# Patient Record
Sex: MALE | Race: WHITE | NOT HISPANIC OR LATINO
[De-identification: names, ages, dates, MRNs, and addresses within clinical notes are randomized per-mention and may not be internally consistent; named-entity substitution may affect disease eponyms.]

---

## 2017-09-12 ENCOUNTER — APPOINTMENT (OUTPATIENT)
Dept: GASTROENTEROLOGY | Facility: CLINIC | Age: 82
End: 2017-09-12
Payer: MEDICARE

## 2017-09-12 PROCEDURE — 99214 OFFICE O/P EST MOD 30 MIN: CPT

## 2018-06-07 ENCOUNTER — APPOINTMENT (OUTPATIENT)
Dept: GASTROENTEROLOGY | Facility: CLINIC | Age: 83
End: 2018-06-07
Payer: MEDICARE

## 2018-06-07 VITALS
HEART RATE: 65 BPM | DIASTOLIC BLOOD PRESSURE: 47 MMHG | HEIGHT: 67 IN | SYSTOLIC BLOOD PRESSURE: 157 MMHG | BODY MASS INDEX: 21.5 KG/M2 | WEIGHT: 137 LBS

## 2018-06-07 DIAGNOSIS — Z87.442 PERSONAL HISTORY OF URINARY CALCULI: ICD-10-CM

## 2018-06-07 DIAGNOSIS — Z85.46 PERSONAL HISTORY OF MALIGNANT NEOPLASM OF PROSTATE: ICD-10-CM

## 2018-06-07 PROCEDURE — 99214 OFFICE O/P EST MOD 30 MIN: CPT

## 2018-06-07 PROCEDURE — 82274 ASSAY TEST FOR BLOOD FECAL: CPT | Mod: QW

## 2018-06-07 RX ORDER — HYDROCORTISONE ACETATE 25 MG/1
25 SUPPOSITORY RECTAL
Qty: 1 | Refills: 3 | Status: ACTIVE | COMMUNITY
Start: 2018-06-07 | End: 1900-01-01

## 2019-10-08 ENCOUNTER — APPOINTMENT (OUTPATIENT)
Dept: GASTROENTEROLOGY | Facility: CLINIC | Age: 84
End: 2019-10-08
Payer: MEDICARE

## 2019-10-08 VITALS
DIASTOLIC BLOOD PRESSURE: 59 MMHG | WEIGHT: 138 LBS | BODY MASS INDEX: 21.66 KG/M2 | SYSTOLIC BLOOD PRESSURE: 153 MMHG | HEART RATE: 62 BPM | HEIGHT: 67 IN

## 2019-10-08 PROCEDURE — 82274 ASSAY TEST FOR BLOOD FECAL: CPT | Mod: QW

## 2019-10-08 PROCEDURE — 99214 OFFICE O/P EST MOD 30 MIN: CPT

## 2019-10-08 NOTE — ASSESSMENT
[FreeTextEntry1] : 1.  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 \par 2.  GERD, possible diabetic gastroparesis. \par 3.  Postprandial hypoglycemia, likely secondary to insulin regimen, gastroparesis/delayed gastric emptying.\par 4.  History of iron deficiency anemia.\par 5.  Long-standing type 1 diabetes mellitus on insulin pump. \par 6.  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  Hypertension. \par 8.  Prostate cancer, status post radiation seed implants and external beam radiation. \par 9.  Allergic to IV dye.\par  \par Plan:\par - Prior labs reviewed. Cr. 0.9 with trace proteinuria.  Patient was advised of long-term risks of PPIs.  As a retired pharmacist, he is aware of risks.  He was advised to use omeprazole 2-3 times a week with Pepcid on the other days.\par - The patient was advised the APC with a flexible sigmoidoscopy could be considered for intermittent rectal bleeding.  Sucralfate enemas could also be considered.  At this time, his symptoms are intermittent and minor.\par - Continue with bowel regimen to minimize constipation.

## 2019-10-08 NOTE — HISTORY OF PRESENT ILLNESS
[Nausea] : denies nausea [Heartburn] : stable heartburn [Vomiting] : denies vomiting [Diarrhea] : denies diarrhea [Constipation] : stable constipation [Abdominal Pain] : denies abdominal pain [Yellow Skin Or Eyes (Jaundice)] : denies jaundice [Abdominal Swelling] : denies abdominal swelling [Rectal Pain] : denies rectal pain [_________] : Performed [unfilled] [de-identified] : radiation proctitis [de-identified] : Mr. Pierson presents to the office today for evaluation of rectal bleeding.  He was last seen in the office in June 2018.\par \par He continues to experience episodic rectal bleeding, with a small amount of red blood on the toilet paper.  It happens more when he is constipated.  He had tried Miralax but finds that milk of magnesia works better for him.  He also suffers from heartburn.  Previously, it was well controlled with Prilosec, but his nephrologist in [place] had him stop it for proteinuria.  He had been using Zantac (and now Pepcid) but it does not work as well.\par \par His last colonoscopy in 2014 showed radiation proctitis.  His previous doctors at [place] were against argon plasma coagulation.  He had undergone 4 formaldehyde enemas before his radiation proctitis was treated many years ago.

## 2019-10-08 NOTE — PHYSICAL EXAM
[General Appearance - Alert] : alert [General Appearance - In No Acute Distress] : in no acute distress [General Appearance - Well Nourished] : well nourished [Sclera] : the sclera and conjunctiva were normal [PERRL With Normal Accommodation] : pupils were equal in size, round, and reactive to light [Extraocular Movements] : extraocular movements were intact [Hearing Threshold Finger Rub Not [place]] : hearing was normal [Outer Ear] : the ears and nose were normal in appearance [Neck Appearance] : the appearance of the neck was normal [Neck Cervical Mass (___cm)] : no neck mass was observed [Auscultation Breath Sounds / Voice Sounds] : lungs were clear to auscultation bilaterally [Heart Sounds] : normal S1 and S2 [Heart Rate And Rhythm] : heart rate was normal and rhythm regular [Edema] : there was no peripheral edema [Abdomen Soft] : soft [Bowel Sounds] : normal bowel sounds [Abdomen Tenderness] : non-tender [] : no hepato-splenomegaly [Abdomen Mass (___ Cm)] : no abdominal mass palpated [Normal Sphincter Tone] : normal sphincter tone [Abdomen Hernia] : no hernia was discovered [No Rectal Mass] : no rectal mass [Internal Hemorrhoid] : internal hemorrhoids [Cervical Lymph Nodes Enlarged Anterior Bilaterally] : anterior cervical [Supraclavicular Lymph Nodes Enlarged Bilaterally] : supraclavicular [No CVA Tenderness] : no ~M costovertebral angle tenderness [Abnormal Walk] : normal gait [Skin Color & Pigmentation] : normal skin color and pigmentation [No Focal Deficits] : no focal deficits [Skin Turgor] : normal skin turgor [Oriented To Time, Place, And Person] : oriented to person, place, and time [External Hemorrhoid] : no external hemorrhoids [Occult Blood Positive] : stool was negative for occult blood [FreeTextEntry1] : FIT negative

## 2020-01-31 RX ORDER — OMEPRAZOLE 20 MG/1
20 CAPSULE, DELAYED RELEASE ORAL DAILY
Qty: 90 | Refills: 3 | Status: ACTIVE | COMMUNITY
Start: 2019-10-08 | End: 1900-01-01

## 2020-07-20 ENCOUNTER — TRANSCRIPTION ENCOUNTER (OUTPATIENT)
Age: 85
End: 2020-07-20

## 2020-08-19 ENCOUNTER — APPOINTMENT (OUTPATIENT)
Dept: GASTROENTEROLOGY | Facility: CLINIC | Age: 85
End: 2020-08-19
Payer: MEDICARE

## 2020-08-19 VITALS
HEART RATE: 60 BPM | TEMPERATURE: 97.3 F | SYSTOLIC BLOOD PRESSURE: 150 MMHG | WEIGHT: 146 LBS | HEIGHT: 67 IN | DIASTOLIC BLOOD PRESSURE: 58 MMHG | BODY MASS INDEX: 22.91 KG/M2

## 2020-08-19 DIAGNOSIS — K64.8 OTHER HEMORRHOIDS: ICD-10-CM

## 2020-08-19 DIAGNOSIS — I10 ESSENTIAL (PRIMARY) HYPERTENSION: ICD-10-CM

## 2020-08-19 PROCEDURE — 82274 ASSAY TEST FOR BLOOD FECAL: CPT | Mod: QW

## 2020-08-19 PROCEDURE — 99214 OFFICE O/P EST MOD 30 MIN: CPT

## 2020-08-19 RX ORDER — FOLIC ACID 1 MG/1
1 TABLET ORAL
Refills: 0 | Status: ACTIVE | COMMUNITY

## 2020-08-19 RX ORDER — LISINOPRIL 10 MG/1
10 TABLET ORAL
Qty: 90 | Refills: 0 | Status: DISCONTINUED | COMMUNITY
Start: 2018-05-11 | End: 2020-08-19

## 2020-08-19 RX ORDER — INSULIN LISPRO 100 [IU]/ML
INJECTION, SOLUTION SUBCUTANEOUS
Refills: 0 | Status: ACTIVE | COMMUNITY

## 2020-08-19 RX ORDER — FAMOTIDINE 20 MG/1
20 TABLET, FILM COATED ORAL
Refills: 0 | Status: ACTIVE | COMMUNITY

## 2020-08-19 RX ORDER — INSULIN DEGLUDEC INJECTION 100 U/ML
100 INJECTION, SOLUTION SUBCUTANEOUS
Refills: 0 | Status: ACTIVE | COMMUNITY

## 2020-08-19 RX ORDER — CALCIUM CARBONATE/VITAMIN D3 500-10/5ML
30 LIQUID (ML) ORAL
Refills: 0 | Status: ACTIVE | COMMUNITY

## 2020-08-19 RX ORDER — INSULIN GLARGINE 100 [IU]/ML
INJECTION, SOLUTION SUBCUTANEOUS
Refills: 0 | Status: DISCONTINUED | COMMUNITY
End: 2020-08-19

## 2020-08-19 NOTE — ASSESSMENT
[FreeTextEntry1] : 1.  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 \par 2.  GERD, possible diabetic gastroparesis. \par 3.  Postprandial hypoglycemia, likely secondary to insulin regimen, gastroparesis/delayed gastric emptying.\par 4.  History of iron deficiency anemia.\par 5.  Long-standing type 1 diabetes mellitus on insulin pump. \par 6.  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  Hypertension. \par 8.  Prostate cancer, status post radiation seed implants and external beam radiation. \par 9.  Allergic to IV dye.\par 10.  Change in bowel habits.\par \par Plan:\par 1.  I had a lengthy conversation with the patient with regard to the possible need for colonoscopy, not so much because of the rectal bleeding, but more so because of his change in bowel habits.  I discussed with him the material risks, benefits and alternatives.  He wishes to wait a month to see if his bowel habits returned to their usual state and also to assess the degree of rectal bleeding.  I said that this was very reasonable, but if he needs to have a colonoscopy, he would most likely to be seen in the office first.\par 2.  Blood test results were reviewed.

## 2020-08-19 NOTE — REASON FOR VISIT
[Follow-Up: _____] : a [unfilled] follow-up visit [FreeTextEntry1] : Bright red blood in stool, change in bowel movements

## 2020-08-19 NOTE — REVIEW OF SYSTEMS
[Heartburn] : heartburn [Nocturia] : nocturia [Joint Pain] : joint pain [Joint Stiffness] : joint stiffness [Easy Bleeding] : a tendency for easy bleeding [Easy Bruising] : a tendency for easy bruising [Negative] : Heme/Lymph [FreeTextEntry7] : Rectal bleeding

## 2020-08-19 NOTE — CONSULT LETTER
[Dear  ___] : Dear  [unfilled], [( Thank you for referring [unfilled] for consultation for _____ )] : Thank you for referring [unfilled] for consultation for [unfilled] [Consult Letter:] : I had the pleasure of evaluating your patient, [unfilled]. [Consult Closing:] : Thank you very much for allowing me to participate in the care of this patient.  If you have any questions, please do not hesitate to contact me. [Sincerely,] : Sincerely, [Please see my note below.] : Please see my note below. [FreeTextEntry3] : Jermaine Oliva MD

## 2020-08-19 NOTE — HISTORY OF PRESENT ILLNESS
[FreeTextEntry1] : Torres developed a change in his bowel habits over the past month, and today, he had an increase in his usual rectal bleeding.  Initially, his stools were loose, and then he became constipated requiring laxatives, but over the past month, he has had 3-5 formed stools a day.  He became concerned today, and was seen emergently because of the rectal bleeding.  He denies abdominal pain.  He was recently diagnosed with polymyalgia rheumatica, and he currently takes prednisone 2.5 mg in the morning and 2 mg in the evening.

## 2020-08-19 NOTE — PHYSICAL EXAM
[General Appearance - Alert] : alert [General Appearance - Well Developed] : well developed [General Appearance - Well Nourished] : well nourished [General Appearance - In No Acute Distress] : in no acute distress [General Appearance - Well-Appearing] : healthy appearing [Sclera] : the sclera and conjunctiva were normal [Extraocular Movements] : extraocular movements were intact [Strabismus] : no strabismus was seen [Nasal Cavity] : the nasal mucosa and septum were normal [Both Tympanic Membranes Were Examined] : both tympanic membranes were normal [Examination Of The Oral Cavity] : the lips and gums were normal [Outer Ear] : the ears and nose were normal in appearance [Neck Appearance] : the appearance of the neck was normal [Oropharynx] : the oropharynx was normal [Jugular Venous Distention Increased] : there was no jugular-venous distention [Neck Cervical Mass (___cm)] : no neck mass was observed [Thyroid Diffuse Enlargement] : the thyroid was not enlarged [Auscultation Breath Sounds / Voice Sounds] : lungs were clear to auscultation bilaterally [Thyroid Nodule] : there were no palpable thyroid nodules [Lungs Percussion] : the lungs were normal to percussion [Heart Rate And Rhythm] : heart rate was normal and rhythm regular [Heart Sounds] : normal S1 and S2 [Heart Sounds Gallop] : no gallops [Heart Sounds Pericardial Friction Rub] : no pericardial rub [Abdominal Aorta] : the abdominal aorta was normal [Arterial Pulses Carotid] : carotid pulses were normal with no bruits [Full Pulse] : the pedal pulses are present [Veins - Varicosity Changes] : there were no varicosital changes [Bowel Sounds] : normal bowel sounds [Abdomen Tenderness] : non-tender [Abdomen Soft] : soft [Abdomen Hernia] : no hernia was discovered [Abdomen Mass (___ Cm)] : no abdominal mass palpated [Normal Sphincter Tone] : normal sphincter tone [No Rectal Mass] : no rectal mass [FreeTextEntry1] : Stool semisolid, brown, Hemoccult and iFOBT positive [Penis Abnormality] : the penis was normal [Occult Blood Positive] : stool positive for occult blood [Scrotum] : the scrotum was normal [Testes Swelling] : the testicles were not swollen [Testes Mass (___cm)] : there were no testicular masses [Cervical Lymph Nodes Enlarged Posterior Bilaterally] : posterior cervical [Cervical Lymph Nodes Enlarged Anterior Bilaterally] : anterior cervical [Supraclavicular Lymph Nodes Enlarged Bilaterally] : supraclavicular [Axillary Lymph Nodes Enlarged Bilaterally] : axillary [Femoral Lymph Nodes Enlarged Bilaterally] : femoral [No CVA Tenderness] : no ~M costovertebral angle tenderness [Inguinal Lymph Nodes Enlarged Bilaterally] : inguinal [Nail Clubbing] : no clubbing  or cyanosis of the fingernails [No Spinal Tenderness] : no spinal tenderness [Abnormal Walk] : normal gait [Motor Tone] : muscle strength and tone were normal [Musculoskeletal - Swelling] : no joint swelling seen [Involuntary Movements] : no involuntary movements were seen [Skin Color & Pigmentation] : normal skin color and pigmentation [Skin Turgor] : normal skin turgor [Skin Lesions] : no skin lesions [No Focal Deficits] : no focal deficits [] : no rash [Affect] : the affect was normal [Oriented To Time, Place, And Person] : oriented to person, place, and time [Impaired Insight] : insight and judgment were intact [Mood] : the mood was normal

## 2020-10-07 ENCOUNTER — APPOINTMENT (OUTPATIENT)
Dept: GASTROENTEROLOGY | Facility: CLINIC | Age: 85
End: 2020-10-07
Payer: MEDICARE

## 2020-10-07 VITALS
TEMPERATURE: 97.5 F | BODY MASS INDEX: 22.44 KG/M2 | DIASTOLIC BLOOD PRESSURE: 74 MMHG | HEART RATE: 71 BPM | HEIGHT: 67 IN | SYSTOLIC BLOOD PRESSURE: 170 MMHG | WEIGHT: 143 LBS

## 2020-10-07 DIAGNOSIS — I35.9 NONRHEUMATIC AORTIC VALVE DISORDER, UNSPECIFIED: ICD-10-CM

## 2020-10-07 DIAGNOSIS — R19.4 CHANGE IN BOWEL HABIT: ICD-10-CM

## 2020-10-07 DIAGNOSIS — M35.3 POLYMYALGIA RHEUMATICA: ICD-10-CM

## 2020-10-07 PROCEDURE — 99214 OFFICE O/P EST MOD 30 MIN: CPT

## 2020-10-07 PROCEDURE — 82274 ASSAY TEST FOR BLOOD FECAL: CPT | Mod: QW

## 2020-10-07 NOTE — REVIEW OF SYSTEMS
[Melena] : melmark [Arthralgias] : arthralgias [Easy Bruising] : a tendency for easy bruising [Negative] : Endocrine

## 2020-10-08 PROBLEM — I35.9 AORTIC VALVE DISEASE: Status: ACTIVE | Noted: 2020-08-19

## 2020-10-08 NOTE — PHYSICAL EXAM
[General Appearance - Alert] : alert [General Appearance - In No Acute Distress] : in no acute distress [General Appearance - Well Nourished] : well nourished [Sclera] : the sclera and conjunctiva were normal [PERRL With Normal Accommodation] : pupils were equal in size, round, and reactive to light [Extraocular Movements] : extraocular movements were intact [Outer Ear] : the ears and nose were normal in appearance [Hearing Threshold Finger Rub Not [place]] : hearing was normal [Neck Appearance] : the appearance of the neck was normal [Neck Cervical Mass (___cm)] : no neck mass was observed [Auscultation Breath Sounds / Voice Sounds] : lungs were clear to auscultation bilaterally [Heart Rate And Rhythm] : heart rate was normal and rhythm regular [Heart Sounds] : normal S1 and S2 [Edema] : there was no peripheral edema [Bowel Sounds] : normal bowel sounds [Abdomen Soft] : soft [Abdomen Tenderness] : non-tender [] : no hepato-splenomegaly [Abdomen Mass (___ Cm)] : no abdominal mass palpated [Abdomen Hernia] : no hernia was discovered [Normal Sphincter Tone] : normal sphincter tone [No Rectal Mass] : no rectal mass [Cervical Lymph Nodes Enlarged Anterior Bilaterally] : anterior cervical [Supraclavicular Lymph Nodes Enlarged Bilaterally] : supraclavicular [No CVA Tenderness] : no ~M costovertebral angle tenderness [Abnormal Walk] : normal gait [Skin Color & Pigmentation] : normal skin color and pigmentation [Skin Turgor] : normal skin turgor [No Focal Deficits] : no focal deficits [Oriented To Time, Place, And Person] : oriented to person, place, and time [Occult Blood Positive] : stool positive for occult blood [Impaired Insight] : insight and judgment were intact [External Hemorrhoid] : no external hemorrhoids [FreeTextEntry1] : brown stool with visible red blood, FIT positive

## 2020-10-08 NOTE — HISTORY OF PRESENT ILLNESS
[Heartburn] : stable heartburn [Nausea] : denies nausea [Vomiting] : denies vomiting [Diarrhea] : denies diarrhea [Constipation] : stable constipation [Yellow Skin Or Eyes (Jaundice)] : denies jaundice [Abdominal Pain] : denies abdominal pain [Abdominal Swelling] : denies abdominal swelling [Rectal Pain] : denies rectal pain [_________] : Performed [unfilled] [de-identified] : Mr. Pierson presents to the office today for follow up of change in bowel habits and rectal bleeding.  He was seen in the office in August 2020 by Dr. Oliva for similar complaints.\par \par Since July of this year, he reports that he has had 3-4 formed stools a day, mostly in the morning.  He is concerned about the change in bowel habits.  Starting in 2006, the patient had diarrhea due to radiation proctitis, which changed to constipation after several years.  For the past 6-7 years, he has been constipated and needed to take laxatives, but he has not needed any laxatives since July 2020.  When he had recurrent rectal bleeding, he was seen by Dr. Oliva in August and advised to undergo a colonoscopy for evaluation of change in bowel habits.  For the past 2 months, he has not seen any bleeding until 5 days ago when he saw a small amount of red blood with brown stool.  The patient is concerned that he may be missing something if he does not do the colonoscopy.  He is eating well and denies any abdominal pain or weight loss.\par \par Of note, the patient was diagnosed with PMR and has been on prednisone since February 2020.  Three days ago he started methotrexate with the hopes of weaning off prednisone.\par \par His last colonoscopy in 2014 showed radiation proctitis.  He has had episodic rectal bleeding for years, especially with constipation.  His previous doctors at [place] were against argon plasma coagulation.  He had undergone 4 formaldehyde enemas in 2006 before his radiation proctitis was treated. [de-identified] : radiation proctitis

## 2020-10-08 NOTE — ASSESSMENT
[FreeTextEntry1] : 1.  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 \par 2.  GERD, possible diabetic gastroparesis. \par 3.  Change in bowel habits (more formed stool instead of constipation or diarrhea).\par 4.  Postprandial hypoglycemia, likely secondary to insulin regimen, gastroparesis/delayed gastric emptying.\par 5.  History of iron deficiency anemia.\par 6.  Long-standing type 1 diabetes mellitus on insulin pump. \par 7.  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  Hypertension. \par 10.  Prostate cancer, status post radiation seed implants and external beam radiation. \par 11.  Allergic to IV dye.\par  \par Plan:\par - Recent labs reviewed.  Despite bleeding, Hb increased from 2018 to 2020, possibly due to steroid use in setting of autoimmune disease.\par - Patient was advised at length of risks and benefits of endoscopic evaluation vs conservative management for change in bowel habits and rectal bleeding, including flexible sigmoidoscopy without anesthesia vs colonoscopy.  He has a long-standing history of rectal bleeding with known radiation proctitis.  He currently has formed stools without abdominal pain or weight loss.  Patient will decide upon scheduling colonoscopy after speaking with his son and PCP.\par - Obtain recent cardiology evaluation given murmur on exam as this may change risk stratification.\par - The patient was advised the APC with a flexible sigmoidoscopy could be considered for intermittent rectal bleeding.  Sucralfate enemas could also be considered.  At this time, his symptoms are intermittent and minor.

## 2020-10-08 NOTE — CONSULT LETTER
[Dear  ___] : Dear  [unfilled], [Consult Letter:] : I had the pleasure of evaluating your patient, [unfilled]. [Please see my note below.] : Please see my note below. [Consult Closing:] : Thank you very much for allowing me to participate in the care of this patient.  If you have any questions, please do not hesitate to contact me. [Sincerely,] : Sincerely, [FreeTextEntry3] : Gene Forbes MD\par Department of Gastroenterology\par [facility]\par [address] [place] [address], [facility] [address]\[place] [address]\par Tel: [phone]\par Email: [email or web address]

## 2021-09-24 ENCOUNTER — NON-APPOINTMENT (OUTPATIENT)
Age: 86
End: 2021-09-24

## 2021-09-28 ENCOUNTER — APPOINTMENT (OUTPATIENT)
Dept: UROLOGY | Facility: CLINIC | Age: 86
End: 2021-09-28
Payer: MEDICARE

## 2021-09-28 VITALS
DIASTOLIC BLOOD PRESSURE: 65 MMHG | RESPIRATION RATE: 17 BRPM | HEIGHT: 67 IN | TEMPERATURE: 97.5 F | SYSTOLIC BLOOD PRESSURE: 159 MMHG | BODY MASS INDEX: 21.97 KG/M2 | HEART RATE: 79 BPM | WEIGHT: 140 LBS

## 2021-09-28 DIAGNOSIS — N28.9 DISORDER OF KIDNEY AND URETER, UNSPECIFIED: ICD-10-CM

## 2021-09-28 PROCEDURE — 99203 OFFICE O/P NEW LOW 30 MIN: CPT

## 2021-09-28 NOTE — ASSESSMENT
[FreeTextEntry1] : 87 yo M with prostate cancer, nephrolithiasis, and renal lesion 9mm\par \par - very low risk at this size\par - per patient psa is undetectable/negative\par - renal US in 1 year in office

## 2021-09-28 NOTE — HISTORY OF PRESENT ILLNESS
[FreeTextEntry1] : Mr Pierson is an 85 yo M with nephrolithiasis s/p PCNL at [place] with Dr. Stratton, prostate ca s/p brachytherapy 2007, who had a cardiology workup recently and had 2 stents placed. When scanned during evaluation found a small 9mm lesion on the left kidney.\par \par Denies LUTS or hematuria, has had proctitis after the radiation compounded by his anticoagulation. \par \par  [Urinary Incontinence] : no urinary incontinence [Urinary Retention] : no urinary retention [Urinary Urgency] : no urinary urgency [Urinary Frequency] : no urinary frequency [Nocturia] : no nocturia

## 2021-10-28 ENCOUNTER — APPOINTMENT (OUTPATIENT)
Dept: GASTROENTEROLOGY | Facility: CLINIC | Age: 86
End: 2021-10-28
Payer: MEDICARE

## 2021-10-28 ENCOUNTER — RX RENEWAL (OUTPATIENT)
Age: 86
End: 2021-10-28

## 2021-10-28 VITALS
WEIGHT: 140 LBS | BODY MASS INDEX: 22.23 KG/M2 | SYSTOLIC BLOOD PRESSURE: 145 MMHG | HEART RATE: 70 BPM | DIASTOLIC BLOOD PRESSURE: 57 MMHG | HEIGHT: 66.5 IN

## 2021-10-28 PROCEDURE — 82274 ASSAY TEST FOR BLOOD FECAL: CPT | Mod: QW

## 2021-10-28 PROCEDURE — 99214 OFFICE O/P EST MOD 30 MIN: CPT

## 2021-10-28 RX ORDER — ROSUVASTATIN CALCIUM 10 MG/1
10 TABLET, FILM COATED ORAL
Refills: 0 | Status: ACTIVE | COMMUNITY

## 2021-10-28 RX ORDER — ASPIRIN ENTERIC COATED TABLETS 81 MG 81 MG/1
81 TABLET, DELAYED RELEASE ORAL
Refills: 0 | Status: ACTIVE | COMMUNITY

## 2021-10-28 NOTE — ASSESSMENT
[FreeTextEntry1] : 1.  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 \par 2.  GERD, possible diabetic gastroparesis. \par 3.  Erratic bowel habits with constipation.\par 4.  CAD status post stent on DAPT.\par 5.  AS status post TAVR.\par 6.  History of iron deficiency anemia.\par 7.  Long-standing type 1 diabetes mellitus on insulin pump. \par 8.  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  Hypertension. \par 11.  Prostate cancer, status post radiation seed implants and external beam radiation. \par 12.  Allergic to IV dye.\par  \par Plan:\par - Continue Milk of magnesia, Colace to minimize constipation.\par - Patient requested prescription for sucralfate tablets which he will consider compounding into an enema for radiation proctitis.\par - Patient was offered gastric emptying study to evaluate for gastroparesis.  He will be going to [place] next and will be unable to perform tests at this time.  Patient was offered trial of low-dose metoclopramide but is unwilling to accept risk of neurologic side effects.  Patient wishes to research possible use of domperidone.  He was advised that domperidone is not FDA approved in the [place] and that it may have cardiac risks associated with its use.  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

## 2021-10-28 NOTE — HISTORY OF PRESENT ILLNESS
[Heartburn] : stable heartburn [Nausea] : denies nausea [Vomiting] : denies vomiting [Diarrhea] : denies diarrhea [Constipation] : stable constipation [Yellow Skin Or Eyes (Jaundice)] : denies jaundice [Abdominal Pain] : denies abdominal pain [Abdominal Swelling] : denies abdominal swelling [Rectal Pain] : denies rectal pain [_________] : Performed [unfilled] [de-identified] : Mr. Pierson presents to the office today for follow up with complaints of bloating, constipation, and rectal bleeding.  He was last seen in the office one year ago in October 2020.\par \par Since his last visit, the patient underwent a cardiac cath in September 2021 and was started on aspirin/ Plavix.  He then underwent a TAVR procedure at [facility] on October 11.  With the blood thinners, he had been seeing small amounts of rectal bleeding with constipation.  His last episode of bleeding occurred 10 days ago when he felt blood in his underwear without having a BM.  He has been using milk of magnesia with Colace every 2 days to minimize constipation.  He has also been using lemon juice in olive oil.  The patient has researched sucralfate enemas for radiation proctitis and would like to try it.\par \par The patient also reports bloating and upper abdominal discomfort after his evening meals.  He reports that he does not eat a large amount.  He reports that his endocrinologist had previously diagnosed him with mild gastroparesis and wanted him to try metoclopramide but he did not want to try it due to potential side effects.  He wonders if domperidone may work for him.  He is going back to [place] next week for he winter.\par \par PMH:   Starting in 2006, the patient had diarrhea due to radiation proctitis, which changed to constipation after several years.  For the past 6-7 years, he has been constipated and needed to take laxatives.  The patient was diagnosed with PMR in February 2020 and takes Prednisone.  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  He has had episodic rectal bleeding for years, especially with constipation.  His previous doctors at [place] were against argon plasma coagulation.  He had undergone 4 formaldehyde enemas in 2006 before his radiation proctitis was treated. [de-identified] : radiation proctitis

## 2021-10-28 NOTE — REVIEW OF SYSTEMS
[Loss Of Hearing] : hearing loss [Lower Ext Edema] : lower extremity edema [Constipation] : constipation [Joint Stiffness] : joint stiffness [Easy Bleeding] : a tendency for easy bleeding [Easy Bruising] : a tendency for easy bruising [Negative] : Endocrine

## 2021-10-28 NOTE — PHYSICAL EXAM
[General Appearance - Alert] : alert [General Appearance - In No Acute Distress] : in no acute distress [General Appearance - Well Nourished] : well nourished [Sclera] : the sclera and conjunctiva were normal [PERRL With Normal Accommodation] : pupils were equal in size, round, and reactive to light [Extraocular Movements] : extraocular movements were intact [Outer Ear] : the ears and nose were normal in appearance [Hearing Threshold Finger Rub Not [place]] : hearing was normal [Neck Appearance] : the appearance of the neck was normal [Neck Cervical Mass (___cm)] : no neck mass was observed [Auscultation Breath Sounds / Voice Sounds] : lungs were clear to auscultation bilaterally [Heart Rate And Rhythm] : heart rate was normal and rhythm regular [Heart Sounds] : normal S1 and S2 [Edema] : there was no peripheral edema [Bowel Sounds] : normal bowel sounds [Abdomen Soft] : soft [Abdomen Tenderness] : non-tender [] : no hepato-splenomegaly [Abdomen Mass (___ Cm)] : no abdominal mass palpated [Abdomen Hernia] : no hernia was discovered [Normal Sphincter Tone] : normal sphincter tone [No Rectal Mass] : no rectal mass [Cervical Lymph Nodes Enlarged Anterior Bilaterally] : anterior cervical [Supraclavicular Lymph Nodes Enlarged Bilaterally] : supraclavicular [No CVA Tenderness] : no ~M costovertebral angle tenderness [Abnormal Walk] : normal gait [Skin Color & Pigmentation] : normal skin color and pigmentation [Skin Turgor] : normal skin turgor [No Focal Deficits] : no focal deficits [Oriented To Time, Place, And Person] : oriented to person, place, and time [Impaired Insight] : insight and judgment were intact [External Hemorrhoid] : no external hemorrhoids [Occult Blood Positive] : stool was negative for occult blood [FreeTextEntry1] : brown stool, FIT negative

## 2021-10-28 NOTE — REASON FOR VISIT
[Follow-Up: _____] : a [unfilled] follow-up visit [FreeTextEntry1] : Constipation, rectal bleeding, bloating

## 2022-05-16 ENCOUNTER — APPOINTMENT (OUTPATIENT)
Dept: ORTHOPEDIC SURGERY | Facility: CLINIC | Age: 87
End: 2022-05-16
Payer: MEDICARE

## 2022-05-16 VITALS
BODY MASS INDEX: 22.5 KG/M2 | WEIGHT: 140 LBS | HEART RATE: 80 BPM | SYSTOLIC BLOOD PRESSURE: 148 MMHG | DIASTOLIC BLOOD PRESSURE: 56 MMHG | HEIGHT: 66 IN

## 2022-05-16 PROCEDURE — 99204 OFFICE O/P NEW MOD 45 MIN: CPT

## 2022-05-17 ENCOUNTER — APPOINTMENT (OUTPATIENT)
Dept: GASTROENTEROLOGY | Facility: CLINIC | Age: 87
End: 2022-05-17
Payer: MEDICARE

## 2022-05-17 VITALS
BODY MASS INDEX: 22.82 KG/M2 | SYSTOLIC BLOOD PRESSURE: 148 MMHG | HEIGHT: 66 IN | WEIGHT: 142 LBS | HEART RATE: 82 BPM | DIASTOLIC BLOOD PRESSURE: 60 MMHG

## 2022-05-17 DIAGNOSIS — D50.9 IRON DEFICIENCY ANEMIA, UNSPECIFIED: ICD-10-CM

## 2022-05-17 DIAGNOSIS — Z83.79 FAMILY HISTORY OF OTHER DISEASES OF THE DIGESTIVE SYSTEM: ICD-10-CM

## 2022-05-17 DIAGNOSIS — Z80.0 FAMILY HISTORY OF MALIGNANT NEOPLASM OF DIGESTIVE ORGANS: ICD-10-CM

## 2022-05-17 PROCEDURE — 99214 OFFICE O/P EST MOD 30 MIN: CPT

## 2022-05-17 PROCEDURE — 82274 ASSAY TEST FOR BLOOD FECAL: CPT | Mod: QW

## 2022-05-17 RX ORDER — PREDNISONE 1 MG/1
1 TABLET ORAL
Refills: 0 | Status: DISCONTINUED | COMMUNITY
End: 2022-05-17

## 2022-05-17 RX ORDER — PREDNISONE 2.5 MG/1
2.5 TABLET ORAL
Refills: 0 | Status: DISCONTINUED | COMMUNITY
End: 2022-05-17

## 2022-05-18 PROBLEM — D50.9 IRON DEFICIENCY ANEMIA: Status: ACTIVE | Noted: 2018-06-07

## 2022-05-18 PROBLEM — Z83.79 FAMILY HISTORY OF INFLAMMATORY BOWEL DISEASE: Status: ACTIVE | Noted: 2018-06-07

## 2022-05-18 PROBLEM — Z80.0 FHX: COLON CANCER: Status: ACTIVE | Noted: 2018-06-07

## 2022-05-18 NOTE — ASSESSMENT
[FreeTextEntry1] : 1.  Iron deficiency anemia.  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 \par 2.  GERD, likely diabetic gastroparesis, with improvement on domperidone. \par 3.  Erratic bowel habits with constipation.\par 4.  CAD status post stent on DAPT.\par 5.  AS status post TAVR.\par 6.  History of iron deficiency anemia.\par 7.  Long-standing type 1 diabetes mellitus on insulin pump. \par 8.  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  Hypertension. \par 11.  Prostate cancer, status post radiation seed implants and external beam radiation. \par 12.  Allergic to IV dye.\par  \par Plan:\par - Recent labs reviewed.\par - Continue small meals.  Domperidone prescription given (10 mg tabs #180).  Patient would rather use domperidone  instead of trying metoclopramide despite possible cardiac effects.  He is a retired pharmacist.\par - Continue sucralfate enemas PRN.\par - Patient was advised that risks of colonoscopy may outweigh and benefits of procedure given his age and comorbidities.  If endoscopic evaluation was to be performed for anemia, he was advised that Plavix would need to be stopped prior to the procedure.\par - Follow up in 5-6 months (before going to [place] for winter).

## 2022-05-18 NOTE — REASON FOR VISIT
[Follow-Up: _____] : a [unfilled] follow-up visit [FreeTextEntry1] : Routine exam - pt has gastroparesis, anemia

## 2022-05-18 NOTE — HISTORY OF PRESENT ILLNESS
[Heartburn] : stable heartburn [Nausea] : denies nausea [Vomiting] : denies vomiting [Diarrhea] : denies diarrhea [Constipation] : stable constipation [Yellow Skin Or Eyes (Jaundice)] : denies jaundice [Abdominal Pain] : denies abdominal pain [Abdominal Swelling] : denies abdominal swelling [Rectal Pain] : denies rectal pain [_________] : Performed [unfilled] [de-identified] : Mr. Pierson presents to the office today for follow up for anemia, gastroparesis, and constipation.  He was last seen in the office in September 2021.\par \par After his last visit, the patient went to [place] for the winter.  While playing pickleball, he developed sciatica and was found to have spinal stenosis.  On routine labs, he was also found to have worsening anemia with Hb of 10.2.  The patient reports that his anemia previously improved when he was on prednisone for PMR and he was weaned off of prednisone last fall.  His doctor in [place] recommended he see a hematologist and undergo a colonoscopy.  He will be seeing Dr. Salmon ([facility]) soon.  In April 2022, the patient started to have reflux and regurgitation.  He started eating less for the evening meal and started taking domperidone in the afternoon and evening.  With domperidone, the patient's gastroparesis symptoms have been controlled and he reports that his bowel movements have been better as well.  He must remain on aspirin and Plavix until September.  He denies melena but does intermittently see red blood with his BMs.  After his last visit, the patient was able to compound sucralfate enemas for radiation proctitis and uses them as needed.  They have helped to minimize bleeding.\par \par PMH:   Starting in 2006, the patient had diarrhea due to radiation proctitis, which changed to constipation after several years.  For the past 6-7 years, he has been constipated and needed to take laxatives.  The patient was diagnosed with PMR in February 2020 and takes Prednisone.  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  He has had episodic rectal bleeding for years, especially with constipation.  His previous doctors at [facility] were against argon plasma coagulation.  He had undergone 4 formaldehyde enemas in 2006 before his radiation proctitis was treated.\par \par In September 2021, the patient underwent a cardiac cath with CHINO and was started on aspirin/ Plavix.  He then underwent a TAVR procedure at [facility] in October 2021.  With the blood thinners, he had been seeing small amounts of rectal bleeding with constipation. \par \par The patient also reports bloating and upper abdominal discomfort after his evening meals.  He reports that his endocrinologist had previously diagnosed him with mild gastroparesis and wanted him to try metoclopramide but he did not want to try it due to potential side effects. [de-identified] : radiation proctitis

## 2022-05-18 NOTE — PHYSICAL EXAM
[General Appearance - Alert] : alert [General Appearance - In No Acute Distress] : in no acute distress [General Appearance - Well Nourished] : well nourished [Sclera] : the sclera and conjunctiva were normal [PERRL With Normal Accommodation] : pupils were equal in size, round, and reactive to light [Extraocular Movements] : extraocular movements were intact [Outer Ear] : the ears and nose were normal in appearance [Hearing Threshold Finger Rub Not [place]] : hearing was normal [Neck Appearance] : the appearance of the neck was normal [Neck Cervical Mass (___cm)] : no neck mass was observed [Auscultation Breath Sounds / Voice Sounds] : lungs were clear to auscultation bilaterally [Heart Rate And Rhythm] : heart rate was normal and rhythm regular [Heart Sounds] : normal S1 and S2 [Edema] : there was no peripheral edema [Bowel Sounds] : normal bowel sounds [Abdomen Soft] : soft [Abdomen Tenderness] : non-tender [] : no hepato-splenomegaly [Abdomen Mass (___ Cm)] : no abdominal mass palpated [Abdomen Hernia] : no hernia was discovered [Normal Sphincter Tone] : normal sphincter tone [No Rectal Mass] : no rectal mass [Cervical Lymph Nodes Enlarged Anterior Bilaterally] : anterior cervical [Supraclavicular Lymph Nodes Enlarged Bilaterally] : supraclavicular [No CVA Tenderness] : no ~M costovertebral angle tenderness [Abnormal Walk] : normal gait [Skin Color & Pigmentation] : normal skin color and pigmentation [Skin Turgor] : normal skin turgor [No Focal Deficits] : no focal deficits [Oriented To Time, Place, And Person] : oriented to person, place, and time [Impaired Insight] : insight and judgment were intact [External Hemorrhoid] : no external hemorrhoids [Occult Blood Positive] : stool was negative for occult blood [FreeTextEntry1] : rectal vault empty, mucus FIT negative

## 2022-05-18 NOTE — REVIEW OF SYSTEMS
[Constipation] : constipation [Heartburn] : heartburn [Limb Pain] : limb pain [Easy Bruising] : a tendency for easy bruising [Negative] : Endocrine [As Noted in HPI] : as noted in HPI

## 2022-07-15 ENCOUNTER — APPOINTMENT (OUTPATIENT)
Dept: PHYSICAL MEDICINE AND REHAB | Facility: CLINIC | Age: 87
End: 2022-07-15

## 2022-07-15 PROCEDURE — 99204 OFFICE O/P NEW MOD 45 MIN: CPT

## 2022-07-15 RX ORDER — VALSARTAN 160 MG/1
160 TABLET, COATED ORAL
Qty: 90 | Refills: 0 | Status: DISCONTINUED | COMMUNITY
Start: 2018-05-14 | End: 2022-07-15

## 2022-07-15 RX ORDER — HYDRALAZINE HYDROCHLORIDE 50 MG/1
50 TABLET ORAL
Qty: 180 | Refills: 0 | Status: ACTIVE | COMMUNITY
Start: 2022-04-20

## 2022-07-15 RX ORDER — CARVEDILOL 6.25 MG/1
6.25 TABLET, FILM COATED ORAL
Qty: 60 | Refills: 0 | Status: ACTIVE | COMMUNITY
Start: 2022-06-09

## 2022-07-15 RX ORDER — METHOTREXATE 2.5 MG/1
2.5 TABLET ORAL
Refills: 0 | Status: ACTIVE | COMMUNITY

## 2022-07-15 NOTE — HISTORY OF PRESENT ILLNESS
[Back] : back [___ mths] : [unfilled] month(s) ago [8] : a maximum pain level of 8/10 [Aching] : aching [Left] : left [Lateral] : lateral aspect of the [Feet] : feet [Standing] : standing [Laying] : laying [Gait Dysfunction] : gait dysfunction [PT] : PT [Injections] : injections [FreeTextEntry6] : 2 right sided epidurals in March in [place]

## 2022-07-15 NOTE — DATA REVIEWED
[MRI] : MRI [FreeTextEntry1] : MRI lumbar spine done 12/20/21 showed evidence of L4-L5 herniation with bilateral neural foraminal narrowing and moderate stenosis, bilateral foraminal narrowing in at L3-L4

## 2022-07-15 NOTE — ASSESSMENT
[FreeTextEntry1] : 87 year old male with low back pain and lumbar radicular pain secondary to stenosis.  Given the nature of the patient's complaints and lack of significant improvement following more conservative measures, we did discuss transforaminal epidural steroid injection.  Risks, benefits, and expectations of the procedure were reviewed.  The patient was provided with an educational pamphlet outlining the details of the procedure so that he/she may have the ability to review the information prior to proceeding.  The patient has agreed to proceed and will follow up with me for the procedure.\par

## 2022-07-19 LAB — SARS-COV-2 N GENE NPH QL NAA+PROBE: NOT DETECTED

## 2022-07-21 ENCOUNTER — OUTPATIENT (OUTPATIENT)
Dept: OUTPATIENT SERVICES | Facility: HOSPITAL | Age: 87
LOS: 1 days | End: 2022-07-21
Payer: MEDICARE

## 2022-07-21 ENCOUNTER — APPOINTMENT (OUTPATIENT)
Dept: PHYSICAL MEDICINE AND REHAB | Facility: CLINIC | Age: 87
End: 2022-07-21

## 2022-07-21 DIAGNOSIS — M54.16 RADICULOPATHY, LUMBAR REGION: ICD-10-CM

## 2022-07-21 PROCEDURE — 64483 NJX AA&/STRD TFRM EPI L/S 1: CPT

## 2022-08-16 ENCOUNTER — APPOINTMENT (OUTPATIENT)
Dept: PHYSICAL MEDICINE AND REHAB | Facility: CLINIC | Age: 87
End: 2022-08-16

## 2022-08-16 VITALS
SYSTOLIC BLOOD PRESSURE: 163 MMHG | OXYGEN SATURATION: 97 % | TEMPERATURE: 97.2 F | HEART RATE: 60 BPM | DIASTOLIC BLOOD PRESSURE: 62 MMHG

## 2022-08-16 DIAGNOSIS — M54.16 RADICULOPATHY, LUMBAR REGION: ICD-10-CM

## 2022-08-16 PROCEDURE — 99214 OFFICE O/P EST MOD 30 MIN: CPT

## 2022-08-16 NOTE — HISTORY OF PRESENT ILLNESS
[FreeTextEntry1] : Patient returns after the first TFESI a few weeks ago.  The patient reports 20% improvement in the symptoms.  The patient is pleased with the results and returns for a follow up visit today.\par \par

## 2022-08-16 NOTE — ASSESSMENT
[FreeTextEntry1] : 87 year old male with low back and lumbar radicular pain only minimally improved following his first TFESI.  He will follow up with me next week for his second procedure.

## 2022-08-31 ENCOUNTER — APPOINTMENT (OUTPATIENT)
Dept: ORTHOPEDIC SURGERY | Facility: CLINIC | Age: 87
End: 2022-08-31

## 2022-08-31 VITALS
DIASTOLIC BLOOD PRESSURE: 61 MMHG | SYSTOLIC BLOOD PRESSURE: 179 MMHG | WEIGHT: 140 LBS | HEIGHT: 66 IN | HEART RATE: 59 BPM | BODY MASS INDEX: 22.5 KG/M2

## 2022-08-31 DIAGNOSIS — M21.6X2 OTHER ACQUIRED DEFORMITIES OF LEFT FOOT: ICD-10-CM

## 2022-08-31 DIAGNOSIS — M21.42 FLAT FOOT [PES PLANUS] (ACQUIRED), LEFT FOOT: ICD-10-CM

## 2022-08-31 DIAGNOSIS — R60.0 LOCALIZED EDEMA: ICD-10-CM

## 2022-08-31 DIAGNOSIS — I99.8 OTHER DISORDER OF CIRCULATORY SYSTEM: ICD-10-CM

## 2022-08-31 PROCEDURE — 73610 X-RAY EXAM OF ANKLE: CPT | Mod: LT

## 2022-08-31 PROCEDURE — 99214 OFFICE O/P EST MOD 30 MIN: CPT

## 2022-09-04 PROBLEM — R60.0 PEDAL EDEMA: Status: ACTIVE | Noted: 2022-09-04

## 2022-09-08 ENCOUNTER — APPOINTMENT (OUTPATIENT)
Dept: ORTHOPEDIC SURGERY | Facility: CLINIC | Age: 87
End: 2022-09-08

## 2022-09-08 VITALS
HEIGHT: 66 IN | WEIGHT: 140 LBS | SYSTOLIC BLOOD PRESSURE: 173 MMHG | HEART RATE: 60 BPM | DIASTOLIC BLOOD PRESSURE: 58 MMHG | BODY MASS INDEX: 22.5 KG/M2

## 2022-09-08 DIAGNOSIS — M25.872 OTHER SPECIFIED JOINT DISORDERS, LEFT ANKLE AND FOOT: ICD-10-CM

## 2022-09-08 PROCEDURE — 20606 DRAIN/INJ JOINT/BURSA W/US: CPT | Mod: LT

## 2022-09-08 PROCEDURE — 99204 OFFICE O/P NEW MOD 45 MIN: CPT | Mod: 25

## 2022-09-08 NOTE — PROCEDURE
[de-identified] : Procedure:  Ultrasound Guided cortisone injection of the Left subtalar joint\par Indication for ultrasound guidance: Ensure placement within the joint, and avoidance of superficial neurovasculature and musculotendinous structures\par Indication for injection: Osteoarthritis\par \par Utlizing the Warp 9 II [employer] portable ultrasound machine, the Linear 25mm 10-5 MHz transducer, sterile probe cover and sterile ultrasound gel, ultrasound guidance with the probe in the longitudinal axis, utilizing an out of plane approach was used for the injection.  A discussion was had with the patient regarding this procedure and all questions were answered. All risks, benefits and alternatives were discussed. These included but were not limited to bleeding, infection, and allergic reaction. A timeout was done to ensure correct side and patient agreed to the procedure.  A [place] trisha was created on the skin utilizing a plastic needle cap to trisha the anticipated point of entry. Alcohol was used to clean the skin, and betadine was used to sterilize and prep the area in the anterolateral foot overlying the subtalar joint. Ethyl chloride spray was then used as a topical anesthetic. A 25-gauge needle was used to inject 2cc of 0.25% bupivacine and 1cc of 40mg/ml methylprednisolone into the subtalar joint. A sterile bandage was then applied. The patient tolerated the procedure well.

## 2022-09-08 NOTE — HISTORY OF PRESENT ILLNESS
[de-identified] : The patient is here today for continued management of chronic ankle pain. Patient was evaluated by my associate Dr. Aquino and determined to have subtalar impingement. He advised ultrasound guided cortisone injection for diagnostic and therapeutic purposes and patient like to proceed with injection at this time. No significant interval change since last evaluation.

## 2022-09-08 NOTE — PHYSICAL EXAM
[de-identified] : Constitutional: Well-nourished, well-developed, No acute distress\par Respiratory:  Good respiratory effort, no SOB\par Lymphatic: No regional lymphadenopathy, no lymphedema\par Psychiatric: Pleasant and normal affect, alert and oriented x3\par Skin: Clean dry and intact L LE\par Musculoskeletal: normal except where as noted in regional exam\par \par \par Left Ankle:\par APPEARANCE: Mild swelling, no marked deformities or malalignment\par POSITIVE TENDERNESS: + Subtalar joint\par NONTENDER: medial malleolus, lateral malleolus, tibialis posterior tendon, achilles tendon, no marked thickening of tendon, ATFL, CFL, PTFL, anterior tibiofibular ligament (high ankle), deltoid ligaments, 5th metatarsal. \par RANGE OF MOTION: Mildly limited dorsiflexion and plantar flexion due to pain, normal inversion/uterine. \par RESISTIVE TESTING: painless resisted dorsiflexion, plantar flexion, inversion & eversion. \par

## 2022-09-08 NOTE — DISCUSSION/SUMMARY
[de-identified] : Discussed findings of today's exam and possible causes of patient's pain.  Educated patient on their most probable diagnosis of Left subtalar joint pain.  Patient elected to proceed with a diagnostic/therapeutic ultrasound guided steroid injection today (see procedure note).  Patient advised to make note of whether their pain is improved starting in the next few minutes until 4-6 hours while the lidocaine numbs the interior of the subtalar joint; this is the diagnostic part of the injection. If pain is relieved immediately that helps us determine that the etiology of the pain is coming from within the subtalar joint. The steroid injected into the joint today will start to have an effect in the coming days, will be most effective in the next 1-2 weeks, and may last 1-2 months; that is the therapeutic portion of this injection. \par Re-examination of the involved ankle after the injection revealed noted improvement.  The patient should follow up with Dr. Aquino, in 1-2 months for further management.  Patient appreciates and agrees with current plan.\par \par I work as part of an academic orthopedic group and routinely have a physician in training (resident / fellow) working with me.  Any part of the history and physical exam performed by the physician in training was either directly reviewed and/or replicated by myself.  Any procedure performed by the physician in training was performed under my direct supervision and with the consent of the patient.\par \par This note was generated using dragon medical dictation software. A reasonable effort has been made for proofreading its contents, but typos may still remain. If there are any questions or points of clarification needed please notify my office.

## 2022-09-21 ENCOUNTER — APPOINTMENT (OUTPATIENT)
Dept: ORTHOPEDIC SURGERY | Facility: CLINIC | Age: 87
End: 2022-09-21

## 2022-10-13 ENCOUNTER — APPOINTMENT (OUTPATIENT)
Dept: GASTROENTEROLOGY | Facility: CLINIC | Age: 87
End: 2022-10-13

## 2022-10-13 VITALS
DIASTOLIC BLOOD PRESSURE: 49 MMHG | HEIGHT: 66 IN | SYSTOLIC BLOOD PRESSURE: 124 MMHG | WEIGHT: 135 LBS | HEART RATE: 64 BPM | BODY MASS INDEX: 21.69 KG/M2

## 2022-10-13 DIAGNOSIS — K21.9 GASTRO-ESOPHAGEAL REFLUX DISEASE W/OUT ESOPHAGITIS: ICD-10-CM

## 2022-10-13 DIAGNOSIS — K62.5 HEMORRHAGE OF ANUS AND RECTUM: ICD-10-CM

## 2022-10-13 PROCEDURE — 82274 ASSAY TEST FOR BLOOD FECAL: CPT | Mod: QW

## 2022-10-13 PROCEDURE — 99214 OFFICE O/P EST MOD 30 MIN: CPT | Mod: 25

## 2022-10-13 RX ORDER — CLOPIDOGREL 75 MG/1
75 TABLET, FILM COATED ORAL
Refills: 0 | Status: DISCONTINUED | COMMUNITY
End: 2022-10-13

## 2022-10-14 PROBLEM — K21.9 GERD (GASTROESOPHAGEAL REFLUX DISEASE): Status: ACTIVE | Noted: 2019-10-08

## 2022-10-14 PROBLEM — K62.5 RECTAL BLEEDING: Status: ACTIVE | Noted: 2018-06-07

## 2022-10-14 NOTE — HISTORY OF PRESENT ILLNESS
[Heartburn] : stable heartburn [Nausea] : denies nausea [Vomiting] : denies vomiting [Diarrhea] : denies diarrhea [Constipation] : stable constipation [Yellow Skin Or Eyes (Jaundice)] : denies jaundice [Abdominal Pain] : denies abdominal pain [Abdominal Swelling] : denies abdominal swelling [Rectal Pain] : denies rectal pain [_________] : Performed [unfilled] [FreeTextEntry1] : Mr. Pierson presents to the office today for follow up for anemia, gastroparesis, and constipation.  He was last seen in the office in May 2022.\par \par Since his last visit, he has been stable from a GI perspective.  Over the summer, he was having regular BMs, twice a day.  SInce the fall started, he has been experiencing constipation and has started to use Colace or Miralax again.  He currently uses domperidone 1-2 tablets before dinner only.  He regurgitates his dinner if he does not take it.  He is taking Prilosec daily and wonders if he should alternate with Pepcid.  He continues to see trace amounts of blood with his BMs but only when he wipes himself.  He has not needed to take sucralfate enemas for radiation proctitis for several months as his bleeding is minimal.  He received an iron infusion and his most recent Hb was 11.5.  If it drops further, he may receive Aranesp with iron.  His biggest complaint has been spinal stenosis and foot pain.  He has been limited in his physical activity and can no longer play pickleball.  He will be going to [place] for the winter.\par \par PMH:   Starting in 2006, the patient had diarrhea due to radiation proctitis, which changed to constipation after several years.  For the past 6-7 years, he has been constipated and needed to take laxatives.  The patient was diagnosed with PMR in February 2020 and takes Prednisone.  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  He has had episodic rectal bleeding for years, especially with constipation.  His previous doctors at [facility] were against argon plasma coagulation.  He had undergone 4 formaldehyde enemas in 2006 before his radiation proctitis was treated.\par \par In September 2021, the patient underwent a cardiac cath with [facility]INO and was started on aspirin/ Plavix.  He then underwent a TAV[facility] procedure at [facility] in October 2021.  With the blood thinners, he had been seeing small amounts of rectal bleeding with constipation. \par \par The patient also reports bloating and upper abdominal discomfort after his evening meals.  He reports that his endocrinologist had previously diagnosed him with mild gastroparesis and wanted him to try metoclopramide but he did not want to try it due to potential side effects. [de-identified] : radiation proctitis

## 2022-10-14 NOTE — ASSESSMENT
[FreeTextEntry1] : 1.  Iron deficiency anemia.  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 \par 2.  GERD, likely diabetic gastroparesis, with improvement on domperidone. \par 3.  Erratic bowel habits with constipation.\par 4.  CAD status post stent on DAPT.\par 5.  AS status post TAVR.\par 6.  History of iron deficiency anemia.\par 7.  Long-standing type 1 diabetes mellitus on insulin pump. \par 8.  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  Hypertension. \par 11.  Prostate cancer, status post radiation seed implants and external beam radiation. \par 12.  Allergic to IV dye.\par  \par Plan:\par - Recent labs reviewed.\par - Continue small meals.  Continue domperidone with dinner.  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

## 2022-10-14 NOTE — PHYSICAL EXAM
[Alert] : alert [Normal Voice/Communication] : normal voice/communication [Sclera] : the sclera and conjunctiva were normal [Hearing Threshold Finger Rub Not [place]] : hearing was normal [Normal Appearance] : the appearance of the neck was normal [No Respiratory Distress] : no respiratory distress [Auscultation Breath Sounds / Voice Sounds] : lungs were clear to auscultation bilaterally [Heart Rate And Rhythm] : heart rate was normal and rhythm regular [Normal S1, S2] : normal S1 and S2 [Bowel Sounds] : normal bowel sounds [Abdomen Tenderness] : non-tender [Normal Sphincter Tone] : normal sphincter tone [No External Hemorrhoid] : no external hemorrhoids [Occult Blood] : positive occult blood [FIT Test] : positive FIT test [No Focal Deficits] : no focal deficits [Oriented To Time, Place, And Person] : oriented to person, place, and time [de-identified] : fraile [de-identified] : slow gait [de-identified] : slightly depressed

## 2022-10-14 NOTE — REVIEW OF SYSTEMS
[Easy Bruising] : a tendency for easy bruising [Negative] : Endocrine [As Noted in HPI] : as noted in HPI [FreeTextEntry8] : Slow urination [FreeTextEntry9] : Limb pain, joint pain

## 2022-10-14 NOTE — REASON FOR VISIT
[Follow-up] : a follow-up of an existing diagnosis [FreeTextEntry1] : gastroparesis, GERD, rectal bleeding

## 2023-02-10 ENCOUNTER — NON-APPOINTMENT (OUTPATIENT)
Age: 88
End: 2023-02-10

## 2023-05-17 ENCOUNTER — APPOINTMENT (OUTPATIENT)
Dept: ORTHOPEDIC SURGERY | Facility: CLINIC | Age: 88
End: 2023-05-17
Payer: MEDICARE

## 2023-05-17 VITALS
DIASTOLIC BLOOD PRESSURE: 66 MMHG | SYSTOLIC BLOOD PRESSURE: 165 MMHG | TEMPERATURE: 98.1 F | OXYGEN SATURATION: 96 % | HEIGHT: 66 IN | BODY MASS INDEX: 22.5 KG/M2 | HEART RATE: 59 BPM | WEIGHT: 140 LBS

## 2023-05-17 PROCEDURE — 99214 OFFICE O/P EST MOD 30 MIN: CPT

## 2023-08-30 ENCOUNTER — APPOINTMENT (OUTPATIENT)
Dept: ORTHOPEDIC SURGERY | Facility: CLINIC | Age: 88
End: 2023-08-30
Payer: MEDICARE

## 2023-08-30 VITALS — HEIGHT: 66 IN | BODY MASS INDEX: 22.5 KG/M2 | WEIGHT: 140 LBS

## 2023-08-30 PROCEDURE — 99214 OFFICE O/P EST MOD 30 MIN: CPT

## 2023-08-30 NOTE — HISTORY OF PRESENT ILLNESS
[de-identified] : 88 year male presents for evaluation of lower back pain.  History L4-5 spondylolisthesis and left L5 radiculopathy. States that the pain has worsened since last visit. He has pain that radiates from the lower back to the left buttock, and from the left tibial region to the ankle. Has numbness/tingling.  Has been taking tylenol which are mildly effective. Avoids NSAIDs as he has Type 1 Diabetes. Has been participating with PT in [place] and [place] over the past year, which provides temporary relief.  S/P LESI in December, only had 1 week of relief.  He also took MDP pack which only helped when he took the first few doses.  Was referred to pain management for SCS. He underwent the SCS trial in July with Dr. Ramírez, but did not feel any improvement. No fever chills sweats nausea vomiting no bowel or bladder dysfunction, no recent weight loss or gain no night pain. This history is in addition to the intake form that I personally reviewed.  [Stable] : stable

## 2023-08-30 NOTE — ADDENDUM
[FreeTextEntry1] : This note was written by Avery Wylie on 08/30/2023 acting as scribe for Dr. Kendrick Srivastava M.D.  I, Kendrick Srivastava MD, have read and attest that all the information, medical decision making and discharge instructions within are true and accurate.

## 2023-08-30 NOTE — DISCUSSION/SUMMARY
[de-identified] : L4-L5 spondylolisthesis. Left L5 radiculopathy. Failed spinal cord stimulator. Discussed all options.  Lumbar MRI referral. F/U after MRI. All options discussed including rest, medicine, home exercise, acupuncture, Chiropractic care, Physical Therapy, Pain management, and last resort surgery. All questions were answered, all alternatives discussed and the patient is in complete agreement with the treatment plan which the patient contributed to and discussed with me through the shared decision making process. Follow-up appointment as instructed. Any issues and the patient will call or come in sooner. Wife agrees with plan.

## 2023-08-30 NOTE — PHYSICAL EXAM
[Stooped] : stooped [Babin's Sign] : negative Babin's sign [Pronator Drift] : negative pronator drift [SLR] : negative straight leg raise [de-identified] : 5 out of 5 motor strength, sensation is intact and symmetrical full range of motion flexion extension and rotation, no palpatory tenderness full range of motion of hips knees shoulders and elbows (all four extremities), no atrophy, negative straight leg raise, no pathological reflexes, no swelling, normal ambulation, no apparent distress skin is intact, no palpable lymph nodes, no upper or lower extremity instability, alert and oriented x3 and normal mood. Normal finger-to nose test.  [de-identified] : I reviewed, interpreted and clinically correlated the following outside imaging studies,  MRI Lumbar Spine 12/2021  Impression:  -3-4mm of Grade I Spondylolisthesis of L4 on L5 -Disc herniation at T12-L1 with effacement of the ventral aspect of the thecal sac. 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 -L2-3 Disc bulge with effacement of the ventral aspect of the thecal sac. Bilateral neural foraminal narrowing. No central canal stenosis. -L3-4 Disc bulge with effacement of the ventral aspect of the thecal sac. Bilateral neural foraminal narrowing. No central canal stenosis. -L4-5 Central disc herniation right paracentral with effacement of the ventral aspect of the thecal sac. Bilateral neural foraminal narrowing. Mild to moderate central canal stenosis.

## 2023-08-31 ENCOUNTER — APPOINTMENT (OUTPATIENT)
Dept: MRI IMAGING | Facility: IMAGING CENTER | Age: 88
End: 2023-08-31
Payer: MEDICARE

## 2023-08-31 ENCOUNTER — OUTPATIENT (OUTPATIENT)
Dept: OUTPATIENT SERVICES | Facility: HOSPITAL | Age: 88
LOS: 1 days | End: 2023-08-31
Payer: MEDICARE

## 2023-08-31 DIAGNOSIS — Z00.8 ENCOUNTER FOR OTHER GENERAL EXAMINATION: ICD-10-CM

## 2023-08-31 PROCEDURE — 72148 MRI LUMBAR SPINE W/O DYE: CPT

## 2023-08-31 PROCEDURE — 72148 MRI LUMBAR SPINE W/O DYE: CPT | Mod: 26,MH

## 2023-09-07 ENCOUNTER — APPOINTMENT (OUTPATIENT)
Dept: ORTHOPEDIC SURGERY | Facility: CLINIC | Age: 88
End: 2023-09-07
Payer: MEDICARE

## 2023-09-07 VITALS — BODY MASS INDEX: 22.5 KG/M2 | WEIGHT: 140 LBS | HEIGHT: 66 IN

## 2023-09-07 DIAGNOSIS — M48.07 SPINAL STENOSIS, LUMBOSACRAL REGION: ICD-10-CM

## 2023-09-07 DIAGNOSIS — M43.16 SPONDYLOLISTHESIS, LUMBAR REGION: ICD-10-CM

## 2023-09-07 PROCEDURE — 99214 OFFICE O/P EST MOD 30 MIN: CPT

## 2023-09-07 NOTE — HISTORY OF PRESENT ILLNESS
[Stable] : stable [de-identified] : 88 year male presents for evaluation of lower back pain.  History L4-5 spondylolisthesis and left L5 radiculopathy. States that the pain has worsened since last visit. He has pain that radiates from the lower back to the left buttock, and from the left tibial region to the ankle. Has numbness/tingling.  Has been taking tylenol which are mildly effective. Avoids NSAIDs as he has Type 1 Diabetes. Has been participating with PT in [place] and [place] over the past year, which provides temporary relief.  S/P LESI in December, only had 1 week of relief.  He also took MDP pack which only helped when he took the first few doses.  Was referred to pain management for SCS. He underwent the SCS trial in July with Dr. Ramírez, but did not feel any improvement. No fever chills sweats nausea vomiting no bowel or bladder dysfunction, no recent weight loss or gain no night pain. This history is in addition to the intake form that I personally reviewed.

## 2023-09-07 NOTE — DISCUSSION/SUMMARY
[Medication Risks Reviewed] : Medication risks reviewed [Surgical risks reviewed] : Surgical risks reviewed [de-identified] : L4-L5 spondylolisthesis with stenosis. Left L5 radiculopathy. Failed spinal cord stimulator. Discussed all options. Patient referred to Dr. Marley to discuss minimally invasive surgery. Refusing open procedur for now.  Risks of surgery includee infection, dural tear, nerve root injury, reherniation, future leg pain, future back pain, retained fragment, hematoma, urinary retention, worsening leg symptoms, foot drop, anesthetic risks, blood transfusion risks, positioning pain, visceral vascular injury, deep vein thrombosis, pulmonary embolus, and death. Somatosensory evoked potentials and EMG monitoring will be used. Surgery will involve lumbar decompression and fusion with or without instrumentation, local auto bone fusion, demineralized bone matrix, and neural monitoring. Patient will need spinal orthosis pre and post operatively. All risks were explained not exclusive to the ones mentioned alternatives were discussed and all questions were answered the patient agrees and understands the above and is in complete agreement with the plan. All options discussed including rest, medicine, home exercise, acupuncture, Chiropractic care, Physical Therapy, Pain management, and last resort surgery. All questions were answered, all alternatives discussed, and the patient is in complete agreement with the treatment plan which the patient contributed to and discussed with me through the shared decision-making process. Follow-up appointment as instructed. Any issues and the patient will call or come in sooner. Wife agrees with plan.

## 2023-09-07 NOTE — ADDENDUM
[FreeTextEntry1] : This note was written by Avery Wylie on 09/07/2023 acting as scribe for Dr. Kendrick Srivastava M.D.  I, Kendrick Srivastava MD, have read and attest that all the information, medical decision making and discharge instructions within are true and accurate.

## 2023-09-13 ENCOUNTER — APPOINTMENT (OUTPATIENT)
Dept: ORTHOPEDIC SURGERY | Facility: CLINIC | Age: 88
End: 2023-09-13
Payer: MEDICARE

## 2023-09-13 VITALS
BODY MASS INDEX: 22.5 KG/M2 | SYSTOLIC BLOOD PRESSURE: 130 MMHG | HEIGHT: 66 IN | WEIGHT: 140 LBS | DIASTOLIC BLOOD PRESSURE: 83 MMHG

## 2023-09-13 DIAGNOSIS — M54.50 LOW BACK PAIN, UNSPECIFIED: ICD-10-CM

## 2023-09-13 PROCEDURE — 72110 X-RAY EXAM L-2 SPINE 4/>VWS: CPT

## 2023-09-13 PROCEDURE — 99215 OFFICE O/P EST HI 40 MIN: CPT

## 2023-09-15 ENCOUNTER — NON-APPOINTMENT (OUTPATIENT)
Age: 88
End: 2023-09-15

## 2023-09-18 ENCOUNTER — NON-APPOINTMENT (OUTPATIENT)
Age: 88
End: 2023-09-18

## 2023-09-26 ENCOUNTER — OUTPATIENT (OUTPATIENT)
Dept: OUTPATIENT SERVICES | Facility: HOSPITAL | Age: 88
LOS: 1 days | End: 2023-09-26
Payer: MEDICARE

## 2023-09-26 VITALS
HEIGHT: 65 IN | WEIGHT: 143.96 LBS | RESPIRATION RATE: 18 BRPM | OXYGEN SATURATION: 97 % | HEART RATE: 61 BPM | SYSTOLIC BLOOD PRESSURE: 154 MMHG | TEMPERATURE: 98 F | DIASTOLIC BLOOD PRESSURE: 64 MMHG

## 2023-09-26 DIAGNOSIS — M54.16 RADICULOPATHY, LUMBAR REGION: ICD-10-CM

## 2023-09-26 DIAGNOSIS — Z95.2 PRESENCE OF PROSTHETIC HEART VALVE: Chronic | ICD-10-CM

## 2023-09-26 DIAGNOSIS — Z29.9 ENCOUNTER FOR PROPHYLACTIC MEASURES, UNSPECIFIED: ICD-10-CM

## 2023-09-26 DIAGNOSIS — E10.9 TYPE 1 DIABETES MELLITUS WITHOUT COMPLICATIONS: ICD-10-CM

## 2023-09-26 DIAGNOSIS — Z98.890 OTHER SPECIFIED POSTPROCEDURAL STATES: Chronic | ICD-10-CM

## 2023-09-26 DIAGNOSIS — Z98.49 CATARACT EXTRACTION STATUS, UNSPECIFIED EYE: Chronic | ICD-10-CM

## 2023-09-26 DIAGNOSIS — I25.10 ATHEROSCLEROTIC HEART DISEASE OF NATIVE CORONARY ARTERY WITHOUT ANGINA PECTORIS: ICD-10-CM

## 2023-09-26 DIAGNOSIS — Z90.89 ACQUIRED ABSENCE OF OTHER ORGANS: Chronic | ICD-10-CM

## 2023-09-26 DIAGNOSIS — Z01.818 ENCOUNTER FOR OTHER PREPROCEDURAL EXAMINATION: ICD-10-CM

## 2023-09-26 DIAGNOSIS — N18.9 CHRONIC KIDNEY DISEASE, UNSPECIFIED: ICD-10-CM

## 2023-09-26 DIAGNOSIS — Z92.3 PERSONAL HISTORY OF IRRADIATION: Chronic | ICD-10-CM

## 2023-09-26 DIAGNOSIS — Z98.61 CORONARY ANGIOPLASTY STATUS: Chronic | ICD-10-CM

## 2023-09-26 LAB
A1C WITH ESTIMATED AVERAGE GLUCOSE RESULT: 6.1 % — HIGH (ref 4–5.6)
ANION GAP SERPL CALC-SCNC: 10 MMOL/L — SIGNIFICANT CHANGE UP (ref 5–17)
BLD GP AB SCN SERPL QL: NEGATIVE — SIGNIFICANT CHANGE UP
BUN SERPL-MCNC: 41 MG/DL — HIGH (ref 7–23)
CALCIUM SERPL-MCNC: 10.2 MG/DL — SIGNIFICANT CHANGE UP (ref 8.4–10.5)
CHLORIDE SERPL-SCNC: 103 MMOL/L — SIGNIFICANT CHANGE UP (ref 96–108)
CO2 SERPL-SCNC: 26 MMOL/L — SIGNIFICANT CHANGE UP (ref 22–31)
CREAT SERPL-MCNC: 1.11 MG/DL — SIGNIFICANT CHANGE UP (ref 0.5–1.3)
EGFR: 65 ML/MIN/1.73M2 — SIGNIFICANT CHANGE UP
ESTIMATED AVERAGE GLUCOSE: 128 MG/DL — HIGH (ref 68–114)
GLUCOSE SERPL-MCNC: 136 MG/DL — HIGH (ref 70–99)
HCT VFR BLD CALC: 32.3 % — LOW (ref 39–50)
HGB BLD-MCNC: 10.6 G/DL — LOW (ref 13–17)
MCHC RBC-ENTMCNC: 32.8 GM/DL — SIGNIFICANT CHANGE UP (ref 32–36)
MCHC RBC-ENTMCNC: 33.4 PG — SIGNIFICANT CHANGE UP (ref 27–34)
MCV RBC AUTO: 101.9 FL — HIGH (ref 80–100)
MRSA PCR RESULT.: SIGNIFICANT CHANGE UP
NRBC # BLD: 0 /100 WBCS — SIGNIFICANT CHANGE UP (ref 0–0)
PLATELET # BLD AUTO: 191 K/UL — SIGNIFICANT CHANGE UP (ref 150–400)
POTASSIUM SERPL-MCNC: 5.1 MMOL/L — SIGNIFICANT CHANGE UP (ref 3.5–5.3)
POTASSIUM SERPL-SCNC: 5.1 MMOL/L — SIGNIFICANT CHANGE UP (ref 3.5–5.3)
RBC # BLD: 3.17 M/UL — LOW (ref 4.2–5.8)
RBC # FLD: 14.2 % — SIGNIFICANT CHANGE UP (ref 10.3–14.5)
RH IG SCN BLD-IMP: POSITIVE — SIGNIFICANT CHANGE UP
S AUREUS DNA NOSE QL NAA+PROBE: SIGNIFICANT CHANGE UP
SODIUM SERPL-SCNC: 139 MMOL/L — SIGNIFICANT CHANGE UP (ref 135–145)
WBC # BLD: 7.43 K/UL — SIGNIFICANT CHANGE UP (ref 3.8–10.5)
WBC # FLD AUTO: 7.43 K/UL — SIGNIFICANT CHANGE UP (ref 3.8–10.5)

## 2023-09-26 PROCEDURE — 86900 BLOOD TYPING SEROLOGIC ABO: CPT

## 2023-09-26 PROCEDURE — G0463: CPT

## 2023-09-26 PROCEDURE — 86850 RBC ANTIBODY SCREEN: CPT

## 2023-09-26 PROCEDURE — 86901 BLOOD TYPING SEROLOGIC RH(D): CPT

## 2023-09-26 PROCEDURE — 85027 COMPLETE CBC AUTOMATED: CPT

## 2023-09-26 PROCEDURE — 87641 MR-STAPH DNA AMP PROBE: CPT

## 2023-09-26 PROCEDURE — 80048 BASIC METABOLIC PNL TOTAL CA: CPT

## 2023-09-26 PROCEDURE — 83036 HEMOGLOBIN GLYCOSYLATED A1C: CPT

## 2023-09-26 PROCEDURE — 87640 STAPH A DNA AMP PROBE: CPT

## 2023-09-26 NOTE — H&P PST ADULT - PROBLEM SELECTOR PLAN 2
patient was evaluated by his cardiologist, report to obtain  patient to hold aspirin 10 days prior to surgery, last dose on 9/25/2023 night patient was evaluated by his cardiologist, report to obtain  patient to continue his aspirin periop

## 2023-09-26 NOTE — H&P PST ADULT - PROBLEM SELECTOR PLAN 4
fs on admit   no diabetic medication on DOS  patient to reduce dose of Tresiba night prior to surgery per protocol (will take 10 units night before)

## 2023-09-26 NOTE — H&P PST ADULT - HISTORY OF PRESENT ILLNESS
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 He reports worsen lower back pain x 2 years radiating to his lower extremities affecting his daily activities, have difficulty walking. He added "have epidural in the past with little relief (>1 year ago), had a spinal cord stimulator trial currently on prednisone for rheumatoid arthritis, says also help with the back pain. 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 He reports worsen lower back pain x 2 years radiating to his lower extremities affecting his daily activities, with difficulty walking. He added "have had epidural in the past with little relief (>1 year ago), had an unsuccessful spinal cord stimulator trial, currently on prednisone for rheumatoid arthritis, says also help a little with the back pain. 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 He reports worsen lower back pain x 2 years radiating to his lower extremities affecting his daily activities, with difficulty walking. He added "have had epidural in the past with little relief (>1 year ago), had an unsuccessful spinal cord stimulator trial, currently on prednisone for rheumatoid arthritis, says also help a little with the back pain. His medical history also significant for low grade MDS dx 2022, anemia of chronic disease, autoimmune dz dx after receiving pneumonia vaccine in 2016, T1DM dx age 37 (Dexcon glucose sensor in place, last A1c 5.7), prostate cancer dx 2006 (had seed implantation and radiation therapy), Aortic valve stenosis s/p TAVR 10/2021, CAD s/p PCI with stenting 09/2021, occasional heartburn, sciatica nerve pain, [place] (bilateral hearing aids).

## 2023-09-26 NOTE — H&P PST ADULT - NSICDXPASTSURGICALHX_GEN_ALL_CORE_FT
PAST SURGICAL HISTORY:  H/O cataract extraction     History of colonoscopy     History of tonsillectomy      PAST SURGICAL HISTORY:  H/O cataract extraction     History of brachytherapy     History of colonoscopy     History of percutaneous coronary intervention     History of tonsillectomy     S/P TAVR (transcatheter aortic valve replacement)

## 2023-09-26 NOTE — H&P PST ADULT - REASON FOR ADMISSION
"I am having minimal invasive spine surgery, lumbar region, I have close to 2 years, sciatica, with difficulty walking."   Goal: "to be able to walk without been in pain."  Pain tolerance level on a scale of 1-10- 3 "I am having minimal invasive spine surgery, lumbar region, I have close to 2 years  of pain, sciatica, with difficulty walking."   Goal: "to be able to walk without been in pain."  Pain tolerance level on a scale of 1-10- 3

## 2023-09-26 NOTE — H&P PST ADULT - NSANTHGENDERRD_ENT_A_CORE
[facility]                  Pulmonary, Critical Care, and Sleep Medicine     Name: Ricarda Nieves MRN: [id number]   : 1950 Hospital: [facility] [place]    Date: 2019        PCCM Note                                              Consult requesting physician: Dr. Jose Hatch  Reason for Consult: ventilator management, seizure    Subjective/History of Present Illness:     Patient is a 79 y.o. male with PMHx significant for ? DVT, epilepsy, found in active seizure by landlord, seizure in ambulance, received ativan, hypoxic due to apnea in ER, intubated in ER. CT head negative. Labile BP. Hypotension requiring vasopressors. 2019   Remains in icu   Extubated yesterday   Remains on NC low o2  No cough fever chills cp dyspnea  PVL positive for DVt, on heparin, seen by vascular. No seizure or other neurologic issues  No other overnight issues. No Known Allergies   No past medical history on file. No past surgical history on file. Social History     Tobacco Use    Smoking status: Not on file   Substance Use Topics    Alcohol use: Not on file      No family history on file. Prior to Admission medications    Medication Sig Start Date End Date Taking? Authorizing Provider   levETIRAcetam (KEPPRA) 750 mg tablet Take 1,500 mg by mouth two (2) times a day. Yes Provider, Historical   phenytoin sodium extended (DILANTIN PO) Take 300 mg by mouth two (2) times a day. Yes Provider, Historical   lacosamide (VIMPAT) 100 mg tab tablet Take 100 mg by mouth two (2) times a day.    Yes Provider, Historical     Current Facility-Administered Medications   Medication Dose Route Frequency    levETIRAcetam (KEPPRA) 1500 mg in saline (iso-osm) 100 ml IVPB  1,500 mg IntraVENous Q12H    heparin 25,000 units in D5W 250 ml infusion  18-36 Units/kg/hr IntraVENous TITRATE    pantoprazole (PROTONIX) 40 mg in sodium chloride 0.9% 10 mL injection  40 mg IntraVENous DAILY    0.9% sodium chloride 1,000 mL with mvi, adult no. 4 with vit K 10 mL, thiamine 855 mg, folic acid 1 mg infusion   IntraVENous Q24H    piperacillin-tazobactam (ZOSYN) 3.375 g in 0.9% sodium chloride (MBP/ADV) 100 mL MBP  3.375 g IntraVENous Q8H    dextrose 5 % - 0.45% NaCl infusion  125 mL/hr IntraVENous CONTINUOUS         Objective:   Vital Signs:    Visit Vitals  /79   Pulse 75   Temp 99.1 °F (37.3 °C)   Resp 19   Ht 6' 6\" (1.981 m)   Wt 83.5 kg (184 lb)   SpO2 98%   BMI 21.26 kg/m²       O2 Device: Nasal cannula   O2 Flow Rate (L/min): 2 l/min   Temp (24hrs), Av.1 °F (37.3 °C), Min:99 °F (37.2 °C), Max:99.1 °F (37.3 °C)       Intake/Output:   Last shift:      No intake/output data recorded. Last 3 shifts:  1901 -  0700  In: 5401.6 [I.V.:5401.6]  Out: 6750 [Urine:6750]      Intake/Output Summary (Last 24 hours) at 2019 1427  Last data filed at 2019 0526  Gross per 24 hour   Intake 2821.38 ml   Output 2450 ml   Net 371.38 ml       Last 3 Recorded Weights in this Encounter    19 1323 19 1043   Weight: 83.8 kg (184 lb 11.9 oz) 83.5 kg (184 lb)       Ventilator Settings:  Mode Rate Tidal Volume Pressure FiO2 PEEP   Spontaneous   450 ml  7 cm H2O 30 % 5 cm H20     Peak airway pressure: 31 cm H2O    Plateau pressure:     Tidal volume:    Minute ventilation: 9.1 l/min   SPO2 97       Physical Exam:     General/Neurology: aaox3, No focal deficit. Head:   Normocephalic, without obvious abnormality, atraumatic. Eye:   EOM intact, pupils not dilated, no scleral icterus, no pallor, no cyanosis. Neck:   Supple, symmetric. No lymphadenopathy. Trachea midline  Lung: Moderate air entry bilateral equal. No rhonchi. No wheezing. No stridors. Heart:   Regular rate & rhythm. S1 S2 present. No murmur. No JVD. Abdomen:  Soft. NT. ND. +BS. No masses. Extremities:  No pedal edema. No cyanosis. No clubbing. Pulses: 2+ and symmetric in DP.  Capillary refill: normal  Lymphatic: No cervical or supraclavicular palpable lymphadenopathy. Skin:   Color, texture, turgor normal. Left thumb dorsal aberration +      Data:       Recent Results (from the past 24 hour(s))   GLUCOSE, POC    Collection Time: 05/23/19  6:39 PM   Result Value Ref Range    Glucose (POC) 117 (H) 70 - 110 mg/dL   EKG, 12 LEAD, INITIAL    Collection Time: 05/23/19  7:54 PM   Result Value Ref Range    Ventricular Rate 82 BPM    Atrial Rate 82 BPM    P-R Interval 192 ms    QRS Duration 96 ms    Q-T Interval 362 ms    QTC Calculation (Bezet) 422 ms    Calculated P Axis 77 degrees    Calculated R Axis -32 degrees    Calculated T Axis 30 degrees    Diagnosis       Normal sinus rhythm  Left axis deviation  Abnormal ECG  When compared with ECG of 22-MAY-2019 14:50,  T wave inversion now evident in Inferior leads  Confirmed by Ana Maria Dutton MD, -- ([id number]) on 5/24/2019 11:12:57 AM     PTT    Collection Time: 05/23/19  8:01 PM   Result Value Ref Range    aPTT >180.0 (HH) 23.0 - 36.4 SEC   CARDIAC PANEL,(CK, CKMB & TROPONIN)    Collection Time: 05/23/19  8:01 PM   Result Value Ref Range    CK 3,280 (H) 39 - 308 U/L    CK - MB 9.4 (H) <3.6 ng/ml    CK-MB Index 0.3 0.0 - 4.0 %    Troponin-I, QT 0.20 (H) 0.0 - 0.045 NG/ML   GLUCOSE, POC    Collection Time: 05/24/19 12:21 AM   Result Value Ref Range    Glucose (POC) 112 (H) 70 - 694 mg/dL   METABOLIC PANEL, COMPREHENSIVE    Collection Time: 05/24/19  4:15 AM   Result Value Ref Range    Sodium 145 136 - 145 mmol/L    Potassium 3.7 3.5 - 5.5 mmol/L    Chloride 113 (H) 100 - 108 mmol/L    CO2 26 21 - 32 mmol/L    Anion gap 6 3.0 - 18 mmol/L    Glucose 95 74 - 99 mg/dL    BUN 11 7.0 - 18 MG/DL    Creatinine 1.14 0.6 - 1.3 MG/DL    BUN/Creatinine ratio 10 (L) 12 - 20      GFR est AA >60 >60 ml/min/1.73m2    GFR est non-AA >60 >60 ml/min/1.73m2    Calcium 7.7 (L) 8.5 - 10.1 MG/DL    Bilirubin, total 0.4 0.2 - 1.0 MG/DL    ALT (SGPT) 47 16 - 61 U/L    AST (SGOT) 132 (H) 15 - 37 U/L    Alk. phosphatase 69 45 - 117 U/L    Protein, total 5.2 (L) 6.4 - 8.2 g/dL    Albumin 2.6 (L) 3.4 - 5.0 g/dL    Globulin 2.6 2.0 - 4.0 g/dL    A-G Ratio 1.0 0.8 - 1.7     CBC WITH AUTOMATED DIFF    Collection Time: 05/24/19  4:15 AM   Result Value Ref Range    WBC 11.3 4.6 - 13.2 K/uL    RBC 3.46 (L) 4.70 - 5.50 M/uL    HGB 10.3 (L) 13.0 - 16.0 g/dL    HCT 31.8 (L) 36.0 - 48.0 %    MCV 91.9 74.0 - 97.0 FL    MCH 29.8 24.0 - 34.0 PG    MCHC 32.4 31.0 - 37.0 g/dL    RDW 13.3 11.6 - 14.5 %    PLATELET 175 (L) 264 - 420 K/uL    MPV 9.4 9.2 - 11.8 FL    NEUTROPHILS 76 (H) 40 - 73 %    LYMPHOCYTES 14 (L) 21 - 52 %    MONOCYTES 8 3 - 10 %    EOSINOPHILS 2 0 - 5 %    BASOPHILS 0 0 - 2 %    ABS. NEUTROPHILS 8.7 (H) 1.8 - 8.0 K/UL    ABS. LYMPHOCYTES 1.6 0.9 - 3.6 K/UL    ABS. MONOCYTES 0.9 0.05 - 1.2 K/UL    ABS. EOSINOPHILS 0.2 0.0 - 0.4 K/UL    ABS.  BASOPHILS 0.0 0.0 - 0.1 K/UL    DF AUTOMATED     MAGNESIUM    Collection Time: 05/24/19  4:15 AM   Result Value Ref Range    Magnesium 2.2 1.6 - 2.6 mg/dL   PHOSPHORUS    Collection Time: 05/24/19  4:15 AM   Result Value Ref Range    Phosphorus 2.8 2.5 - 4.9 MG/DL   PTT    Collection Time: 05/24/19  4:20 AM   Result Value Ref Range    aPTT >180.0 (HH) 23.0 - 36.4 SEC   CARDIAC PANEL,(CK, CKMB & TROPONIN)    Collection Time: 05/24/19  4:20 AM   Result Value Ref Range    CK 3,212 (H) 39 - 308 U/L    CK - MB 6.6 (H) <3.6 ng/ml    CK-MB Index 0.2 0.0 - 4.0 %    Troponin-I, QT 0.16 (H) 0.0 - 0.045 NG/ML   CALCIUM, IONIZED    Collection Time: 05/24/19  4:20 AM   Result Value Ref Range    Ionized Calcium 1.13 1.12 - 1.32 MMOL/L   GLUCOSE, POC    Collection Time: 05/24/19  5:05 AM   Result Value Ref Range    Glucose (POC) 113 (H) 70 - 110 mg/dL   FIBRINOGEN    Collection Time: 05/24/19 10:30 AM   Result Value Ref Range    Fibrinogen 227 210 - 451 mg/dL   PTT    Collection Time: 05/24/19 10:30 AM   Result Value Ref Range    aPTT >180.0 (HH) 23.0 - 36.4 SEC   MIXING STUDY, PT    Collection Time: 05/24/19 10:30 AM   Result Value Ref Range    PT mixing study          Mixing study interpretation PENDING     Prothrombin time 19.2 (H) 11.5 - 15.2 sec    INR 1.6 (H) 0.8 - 1.2      PT 1:1 mix patient PENDING sec    PT incubated patient PENDING sec         Chemistry Recent Labs     05/24/19 0415 05/23/19  0551 05/22/19  1850 05/22/19  1339   GLU 95 107* 83 140*    145 142 148*   K 3.7 4.4 4.5 3.2*   * 114* 110* 121*   CO2 26 24 24 11*   BUN 11 17 14 10   CREA 1.14 1.62* 1.37* 0.77   CA 7.7* 7.9* 8.2* 5.3*   MG 2.2  --   --  1.6   PHOS 2.8  --   --   --    AGAP 6 7 8 16   BUCR 10* 10* 10* 13   AP 69 82  --  66   TP 5.2* 5.6*  --  4.2*   ALB 2.6* 2.8*  --  2.0*   GLOB 2.6 2.8  --  2.2   AGRAT 1.0 1.0  --  0.9        Lactic Acid Lactic acid   Date Value Ref Range Status   05/22/2019 1.9 0.4 - 2.0 MMOL/L Final     Recent Labs     05/22/19  1850   LAC 1.9        Liver Enzymes Protein, total   Date Value Ref Range Status   05/24/2019 5.2 (L) 6.4 - 8.2 g/dL Final     Albumin   Date Value Ref Range Status   05/24/2019 2.6 (L) 3.4 - 5.0 g/dL Final     Globulin   Date Value Ref Range Status   05/24/2019 2.6 2.0 - 4.0 g/dL Final     A-G Ratio   Date Value Ref Range Status   05/24/2019 1.0 0.8 - 1.7   Final     AST (SGOT)   Date Value Ref Range Status   05/24/2019 132 (H) 15 - 37 U/L Final     Alk.  phosphatase   Date Value Ref Range Status   05/24/2019 69 45 - 117 U/L Final     Recent Labs     05/24/19  0415 05/23/19  0551 05/22/19  1339   TP 5.2* 5.6* 4.2*   ALB 2.6* 2.8* 2.0*   GLOB 2.6 2.8 2.2   AGRAT 1.0 1.0 0.9   SGOT 132* 83* 26   AP 69 82 66        CBC w/Diff Recent Labs     05/24/19  0415 05/23/19  1400 05/23/19  0551 05/22/19  1339   WBC 11.3 12.5 12.4 9.3   RBC 3.46* 3.97* 3.90* 3.35*   HGB 10.3* 11.8* 11.7* 10.0*   HCT 31.8* 36.6 35.8* 31.4*   * 155 136 158   GRANS 76* 85*  --  83*   LYMPH 14* 7*  --  14*   EOS 2 0  --  1        Cardiac Enzymes Lab Results   Component Value Date/Time    CPK 3,212 (H) 05/24/2019 04:20 AM    CPK 3,280 (H) 05/23/2019 08:01 PM    CKMB 6.6 (H) 05/24/2019 04:20 AM    CKMB 9.4 (H) 05/23/2019 08:01 PM    CKND1 0.2 05/24/2019 04:20 AM    CKND1 0.3 05/23/2019 08:01 PM    TROIQ 0.16 (H) 05/24/2019 04:20 AM    TROIQ 0.20 (H) 05/23/2019 08:01 PM        BNP No results found for: BNP, BNPP, XBNPT     Coagulation Recent Labs     05/24/19  1030 05/24/19  0420 05/23/19  2001   PTP 19.2*  --   --    INR 1.6*  --   --    APTT >180.0* >180.0* >180.0*         Thyroid  No results found for: T4, T3U, TSH, TSHEXT, TSHEXT    No results found for: T4     Urinalysis Lab Results   Component Value Date/Time    Color YELLOW 05/22/2019 02:27 PM    Appearance CLEAR 05/22/2019 02:27 PM    Specific gravity 1.018 05/22/2019 02:27 PM    pH (UA) 5.5 05/22/2019 02:27 PM    Protein 300 (A) 05/22/2019 02:27 PM    Glucose NEGATIVE  05/22/2019 02:27 PM    Ketone NEGATIVE  05/22/2019 02:27 PM    Bilirubin NEGATIVE  05/22/2019 02:27 PM    Urobilinogen 0.2 05/22/2019 02:27 PM    Nitrites NEGATIVE  05/22/2019 02:27 PM    Leukocyte Esterase NEGATIVE  05/22/2019 02:27 PM    Epithelial cells FEW 05/22/2019 02:27 PM    Bacteria 1+ (A) 05/22/2019 02:27 PM    WBC 0 to 1 05/22/2019 02:27 PM    RBC 0 to 3 05/22/2019 02:27 PM        Micro  Recent Labs     05/22/19 1945 05/22/19 1850 05/22/19  1610   CULT NO GROWTH 2 DAYS NO GROWTH 2 DAYS FEW YEAST*  MANY NORMAL RESPIRATORY ALEJANDRO     Recent Labs     05/22/19 1945 05/22/19 1850 05/22/19  1610   CULT NO GROWTH 2 DAYS NO GROWTH 2 DAYS FEW YEAST*  MANY NORMAL RESPIRATORY ALEJANDRO        ABG Recent Labs     05/23/19  0540 05/22/19  1404   PHI 7.312* 7.127*   PCO2I 40.4 56.1*   PO2I 156* 285*   HCO3I 20.5* 18.6*   FIO2I 40 100          CT (Most Recent) (CT chest reviewed by me) Results from Hospital Encounter encounter on 05/22/19   CT HEAD WO CONT    Narrative EXAM: CT head    INDICATION: Seizure, patient unresponsive. COMPARISON: None.     TECHNIQUE: Axial CT imaging of the head was performed without intravenous  contrast.    One or more dose reduction techniques were used on this CT: automated exposure  control, adjustment of the mAs and/or kVp according to patient size, and  iterative reconstruction techniques. The specific techniques used on this CT  exam have been documented in the patient's electronic medical record. Digital  Imaging and Communications in Medicine (DICOM) format image data are available  to nonaffiliated external healthcare facilities or entities on a secure, media  free, reciprocally searchable basis with patient authorization for at least a  12-month period after this study. _______________    FINDINGS:    BRAIN AND POSTERIOR FOSSA: Cortical sulci volume is within normal limits for  patient age. The ventricular size and configuration is normal. Physiologic  bilateral basal ganglia calcifications are present. There is no intracranial  hemorrhage, mass effect, or midline shift. The gray-white matter differentiation  is within normal limits. Periventricular white matter low-attenuation is  present. EXTRA-AXIAL SPACES AND MENINGES: There are no abnormal extra-axial fluid  collections. CALVARIUM: Intact. SINUSES: Clear. OTHER: None.    _______________      Impression 1. No acute intracranial abnormality. 2. Mild periventricular white matter low-attenuation; nonspecific finding  favored to reflect sequela of chronic ischemic microvascular change. CRITICAL RESULT:  CODE S results called to Dr. Jayy Modi in the emergency room  prior to dictation at 1425 hours on 5/22/2019            XR (Most Recent).  CXR  reviewed by me and compared with previous CXR Results from Hospital Encounter encounter on 05/22/19   XR CHEST PORT    Narrative EXAM: XR CHEST PORT    CLINICAL INDICATION/HISTORY: intubated  -Additional: None    COMPARISON: May 22, 2019    TECHNIQUE: Portable frontal view of the chest    _______________    FINDINGS:    SUPPORT DEVICES: Endotracheal tube with tip projecting approximately 5.9 cm  above the juwan. Right IJ central venous catheter with tip projecting over the  superior cavoatrial junction. Nasogastric tube below the diaphragm, tip  collimated from view. HEART AND MEDIASTINUM: Stable cardiac size and mediastinal contours. LUNGS AND PLEURAL SPACES: Linear areas of opacity at each lung base are present  without discrete pneumonic consolidation, pneumothorax, or pleural effusion. BONY THORAX AND SOFT TISSUES: Unremarkable.    _______________      Impression 1. Endotracheal tube, visualized nasogastric tube, and right IJ central venous  catheter in stable position. 2. Linear areas of basilar atelectasis without superimposed acute radiographic  abnormality. EEG 5/23/19: IMPRESSION: This EEG is abnormal due to generalized slowing and low amplitude background, which is an indicator of diffuse cerebral dysfunction from any cause including -but not limited to- toxic, metabolic and infectious etiologies. Intermittent beta-range waves are likely caused by sedative medications such as benzodiazepine. There are no ictal or interictal findings with a specific correlation with seizures. 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 · Subacute non-occlusive thrombus present in the right profunda femoral vein. · Subacute non-occlusive thrombus present in the right proximal femoral vein. · Subacute non-occlusive thrombus present in the right mid femoral vein. · Acute occlusive thrombus present in the right distal femoral vein. · Subacute non-occlusive thrombus present in the right popliteal vein. · Acute non-occlusive thrombus present in the left common femoral vein. · Acute non-occlusive thrombus present in the left saphenofemoral junction. · Subacute non-occlusive thrombus present in the left profunda femoral vein.   · Subacute non-occlusive thrombus present in the left femoral vein.  · Subacute non-occlusive present in the left femoral vein. · Subacute non-occlusive thrombus present in the left popliteal vein. Echo 5/23/19:  · Left Ventricle: Mild-to-moderate systolic dysfunction. Estimated left ventricular ejection fraction is 41 - 45%. E/E' ratio is 8.91. .  · Left Atrium: Mildly dilated left atrium. · Aortic Valve: Probably trileaflet aortic valve. · Mitral Valve: Trace mitral valve regurgitation. · Right Ventricle: Mildly dilated right ventricle. · Pulmonary Artery: There is no evidence of pulmonary hypertension. VQ scan 5/23/19:  Moderate perfusion defects in the right upper and left lower lobe are  intermediate probability for pulmonary embolism. IMPRESSION:   · Recurrent seizure, with hx of epilepsy (home regimen keppra. Dilantin stopped recently due to interaction with coumadin)  · Left weakness, ? Pako's paralysis. Resolved. · Respiratory failure / apnea, intubated in ER 5/22/19, extubated 5/23/19  · Possible aspiration pneumonia vs pneumonitis. · B/l LE extensive acute, subacute and chronic DVT. · Intermediate probability for PE on VQ scan. · LVEF 41-45%. · Hypocalcemia  · Hypokalemia, replaced  · Hypernatremia   · Elevated ammonia likely due to seizure, resolved. · Elevated CPK, likely due to seizure  · Mildly abnormal troponin likely due to seizure  · Hx of DVT (not on anticoagulation, reported by spouse)  · THC positive   · Anemia, no bleeding. · Code status: full      RECOMMENDATIONS:   Respiratory: extubated to NC yesterday. respi status good. No overnight respi issues. Keep SPO2 >=92%. HOB 30 degree elevation all the time. Aggressive pulmonary toileting. Aspiration precautions. Incentive spirometry. CVS: off levophed since yesterday. Echo with low LVEF, consider cardiology consult. Defer to primary team.   B/l LE PVL positive for acute subacute and chronic DVT. Intermediate probability for PE on VQ.   Not sure if he was compliant to coumadin. Hypercoagulable w/u pending. On heparin drip. Vascular surgeon appreciated. ID: zosyn for possible aspiration, CXR ok. Changed zosyn to augmentin to complete total 5 day course. Endotracheal aspirate cx many normal reid. Blood cx NGTD  Follow cultures. Hematology/Oncology: mild anemia, no active external bleeding  Renal: monitor renal function. Follow Na. GI/: no acute issues  Endocrine: Monitor BS. Neurology: dilantin was stopped due to coumadin interaction. Was on keppra 1000 mg at home. keppra increased to 1500 mg. CT head nil acute. Left paralysis resolved. EEG showed generalized slowing. Ammonia normalized. Neurology appreciated-signed off. Pain/Sedation: no issues  Skin/Wound: no acute issues  Electrolytes: Replace electrolytes per ICU electrolyte replacement protocol. IVF: D/w IVF, taking PO after ST eval  Nutrition: PO diet  Prophylaxis: DVT Rx: heparin. GI Prophylaxis: protoni. Lines/Tubes: PIV  ETT: 5/22/19-5/23/19  OGT: 5/22/19-5/23/19  Central line: RIJ 5/22/19 - to be d/c'ed before transfer out of icu 5/24/19 (site without sign of infection. Medically necessary, will remove it when not needed. Centra line bundle followed). Miller: 5/22/19-5/23/19    Will defer respective systems problem management to primary and other respective consultant and follow patient in ICU with primary and other medical team.  Further recommendations will be based on the patient's response to recommended treatment and results of the investigation ordered. Quality Care: PPI, DVT prophylaxis, HOB elevated, Infection control all reviewed and addressed. Care of plan d/w RN, RT, MDR.   D/w pt, daughter, wife at bedside, updated. High complexity decision making was performed during the evaluation of this patient at high risk for decompensation with multiple organ involvement. Total critical care time spent rendering care exclusive of procedures: 37 minutes. Transfer to tele.  Once transferred, PCCM will sign off, call us with any questions.           Bryce Corbett MD  5/24/2019 Yes

## 2023-09-26 NOTE — H&P PST ADULT - ASSESSMENT
Dasi activities: swimming for 30 mins daily, walking  Dasi score: 5.62  patient with top and bottom removable denture  patient denies any known adverse anesthesia reaction in self or any family members     CAPROBBIEI SCORE [CLOT updated 18]    AGE RELATED RISK FACTORS                                                       MOBILITY RELATED FACTORS  [ ] Age 41-60 years                                            (1 Point)                    [ ] Bed rest                                                        (1 Point)  [ ] Age: 61-74 years                                           (2 Points)                  [ ] Plaster cast                                                   (2 Points)  [3 ] Age= 75 years                                              (3 Points)                    [ ] Bed bound for more than 72 hours                 (2 Points)    DISEASE RELATED RISK FACTORS                                               GENDER SPECIFIC FACTORS  [ ] Edema in the lower extremities                       (1 Point)              [ ] Pregnancy                                                     (1 Point)  [ ] Varicose veins                                               (1 Point)                     [ ] Post-partum < 6 weeks                                   (1 Point)             [ ] BMI > 25 Kg/m2                                            (1 Point)                     [ ] Hormonal therapy  or oral contraception          (1 Point)                 [ ] Sepsis (in the previous month)                        (1 Point)               [ ] History of pregnancy complications                 (1 point)  [ ] Pneumonia or serious lung disease                                               [ ] Unexplained or recurrent                     (1 Point)           (in the previous month)                               (1 Point)  [ ] Abnormal pulmonary function test                     (1 Point)                 SURGERY RELATED RISK FACTORS  [ ] Acute myocardial infarction                              (1 Point)               [ ]  Section                                             (1 Point)  [ ] Congestive heart failure (in the previous month)  (1 Point)      [ ] Minor surgery                                                  (1 Point)   [ ] Inflammatory bowel disease                             (1 Point)               [ ] Arthroscopic surgery                                        (2 Points)  [ ] Central venous access                                      (2 Points)                [2 ] General surgery lasting more than 45 minutes (2 points)  [ 2] Present or previous malignancy                     (2 Points)                [ ] Elective arthroplasty                                         (5 points)    [ ] Stroke (in the previous month)                          (5 Points)                                                                                                                                                           HEMATOLOGY RELATED FACTORS                                                 TRAUMA RELATED RISK FACTORS  [ ] Prior episodes of VTE                                     (3 Points)                [ ] Fracture of the hip, pelvis, or leg                       (5 Points)  [ ] Positive family history for VTE                         (3 Points)             [ ] Acute spinal cord injury (in the previous month)  (5 Points)  [ ] Prothrombin 01103 A                                     (3 Points)               [ ] Paralysis  (less than 1 month)                             (5 Points)  [ ] Factor V Leiden                                             (3 Points)                  [ ] Multiple Trauma within 1 month                        (5 Points)  [ ] Lupus anticoagulants                                     (3 Points)                                                           [ ] Anticardiolipin antibodies                               (3 Points)                                                       [ ] High homocysteine in the blood                      (3 Points)                                             [ ] Other congenital or acquired thrombophilia      (3 Points)                                                [ ] Heparin induced thrombocytopenia                  (3 Points)                                     Total Score [7 ]

## 2023-09-26 NOTE — H&P PST ADULT - PROBLEM SELECTOR PLAN 1
scheduled for L4-5 decompression   preop instruction provided in writing and verbally, patient verbalized understanding and teach back   labs drawn per protocol   patient was evaluated by his pcp, report to obtain  patient was instructed by his rheumatologist to hold methotrexate x 1 week prior to surgery, last dose on scheduled for L4-5 decompression   preop instruction provided in writing and verbally, patient verbalized understanding and teach back   labs drawn per protocol   patient was evaluated by his pcp, report to obtain  patient was instructed by his rheumatologist to hold methotrexate x 1 week prior to surgery, last dose was on 9/27

## 2023-09-26 NOTE — H&P PST ADULT - NSICDXPASTMEDICALHX_GEN_ALL_CORE_FT
PAST MEDICAL HISTORY:  Chronic kidney disease (CKD)     Sciatic nerve pain     T1DM (type 1 diabetes mellitus)      PAST MEDICAL HISTORY:  Anemia of chronic disease     CAD (coronary artery disease)     Chronic kidney disease (CKD)     History of autoimmune disease     History of heartburn     History of radiation therapy     History of rheumatoid arthritis     [place] (hard of hearing)     Hypertension     MDS (myelodysplastic syndrome), low grade     Prostate cancer     Sciatic nerve pain     T1DM (type 1 diabetes mellitus)      PAST MEDICAL HISTORY:  Anemia of chronic disease     CAD (coronary artery disease)     Chronic kidney disease (CKD)     H/O aortic valve stenosis     H/O erectile dysfunction     History of autoimmune disease     History of heartburn     History of radiation therapy     History of rheumatoid arthritis     [place] (hard of hearing)     Hypertension     MDS (myelodysplastic syndrome), low grade     Prostate cancer     Sciatic nerve pain     T1DM (type 1 diabetes mellitus)

## 2023-09-26 NOTE — H&P PST ADULT - ATTENDING COMMENTS
88 year old male with lumbar radiculopathy, lumbar neurogenic claudication in the setting of L4-L5 spinal stenosis. We will proceed with a L4-L5 decompression    PreOp Ehl/TA 4/5 bilaterally

## 2023-09-26 NOTE — H&P PST ADULT - NSICDXPROCEDURE_GEN_ALL_CORE_FT
PROCEDURES:  Decompression, lumbar spine 26-Sep-2023 15:21:16 radiculopathy lumbar region Sonja Navarrete

## 2023-09-29 ENCOUNTER — APPOINTMENT (OUTPATIENT)
Dept: ORTHOPEDIC SURGERY | Facility: CLINIC | Age: 88
End: 2023-09-29
Payer: MEDICARE

## 2023-09-29 VITALS — BODY MASS INDEX: 22.5 KG/M2 | WEIGHT: 140 LBS | HEIGHT: 66 IN

## 2023-09-29 PROCEDURE — 99215 OFFICE O/P EST HI 40 MIN: CPT

## 2023-10-01 PROBLEM — M54.16 LUMBAR RADICULAR PAIN: Status: ACTIVE | Noted: 2022-07-15

## 2023-10-02 ENCOUNTER — TRANSCRIPTION ENCOUNTER (OUTPATIENT)
Age: 88
End: 2023-10-02

## 2023-10-03 ENCOUNTER — APPOINTMENT (OUTPATIENT)
Dept: ORTHOPEDIC SURGERY | Facility: HOSPITAL | Age: 88
End: 2023-10-03

## 2023-10-03 ENCOUNTER — INPATIENT (INPATIENT)
Facility: HOSPITAL | Age: 88
LOS: 0 days | Discharge: HOME | DRG: 517 | End: 2023-10-04
Attending: GENERAL PRACTICE | Admitting: GENERAL PRACTICE
Payer: COMMERCIAL

## 2023-10-03 VITALS
WEIGHT: 143.96 LBS | SYSTOLIC BLOOD PRESSURE: 172 MMHG | RESPIRATION RATE: 20 BRPM | TEMPERATURE: 98 F | OXYGEN SATURATION: 100 % | HEART RATE: 59 BPM | DIASTOLIC BLOOD PRESSURE: 70 MMHG | HEIGHT: 65 IN

## 2023-10-03 DIAGNOSIS — Z98.61 CORONARY ANGIOPLASTY STATUS: Chronic | ICD-10-CM

## 2023-10-03 DIAGNOSIS — Z98.890 OTHER SPECIFIED POSTPROCEDURAL STATES: Chronic | ICD-10-CM

## 2023-10-03 DIAGNOSIS — Z92.3 PERSONAL HISTORY OF IRRADIATION: Chronic | ICD-10-CM

## 2023-10-03 DIAGNOSIS — Z95.2 PRESENCE OF PROSTHETIC HEART VALVE: Chronic | ICD-10-CM

## 2023-10-03 DIAGNOSIS — M54.16 RADICULOPATHY, LUMBAR REGION: ICD-10-CM

## 2023-10-03 DIAGNOSIS — Z98.49 CATARACT EXTRACTION STATUS, UNSPECIFIED EYE: Chronic | ICD-10-CM

## 2023-10-03 DIAGNOSIS — Z90.89 ACQUIRED ABSENCE OF OTHER ORGANS: Chronic | ICD-10-CM

## 2023-10-03 LAB
ANION GAP SERPL CALC-SCNC: 13 MMOL/L — SIGNIFICANT CHANGE UP (ref 5–17)
BUN SERPL-MCNC: 36 MG/DL — HIGH (ref 7–23)
CALCIUM SERPL-MCNC: 9.3 MG/DL — SIGNIFICANT CHANGE UP (ref 8.4–10.5)
CHLORIDE SERPL-SCNC: 100 MMOL/L — SIGNIFICANT CHANGE UP (ref 96–108)
CO2 SERPL-SCNC: 21 MMOL/L — LOW (ref 22–31)
CREAT SERPL-MCNC: 1.11 MG/DL — SIGNIFICANT CHANGE UP (ref 0.5–1.3)
EGFR: 65 ML/MIN/1.73M2 — SIGNIFICANT CHANGE UP
GLUCOSE BLDC GLUCOMTR-MCNC: 103 MG/DL — HIGH (ref 70–99)
GLUCOSE BLDC GLUCOMTR-MCNC: 154 MG/DL — HIGH (ref 70–99)
GLUCOSE BLDC GLUCOMTR-MCNC: 158 MG/DL — HIGH (ref 70–99)
GLUCOSE BLDC GLUCOMTR-MCNC: 167 MG/DL — HIGH (ref 70–99)
GLUCOSE BLDC GLUCOMTR-MCNC: 171 MG/DL — HIGH (ref 70–99)
GLUCOSE BLDC GLUCOMTR-MCNC: 190 MG/DL — HIGH (ref 70–99)
GLUCOSE SERPL-MCNC: 179 MG/DL — HIGH (ref 70–99)
POTASSIUM SERPL-MCNC: 5.2 MMOL/L — SIGNIFICANT CHANGE UP (ref 3.5–5.3)
POTASSIUM SERPL-MCNC: 5.4 MMOL/L — HIGH (ref 3.5–5.3)
POTASSIUM SERPL-SCNC: 5.2 MMOL/L — SIGNIFICANT CHANGE UP (ref 3.5–5.3)
POTASSIUM SERPL-SCNC: 5.4 MMOL/L — HIGH (ref 3.5–5.3)
SODIUM SERPL-SCNC: 134 MMOL/L — LOW (ref 135–145)

## 2023-10-03 PROCEDURE — 63047 LAM FACETEC & FORAMOT LUMBAR: CPT

## 2023-10-03 PROCEDURE — 93970 EXTREMITY STUDY: CPT | Mod: 26

## 2023-10-03 PROCEDURE — 63048 LAM FACETEC &FORAMOT EA ADDL: CPT

## 2023-10-03 DEVICE — SURGIFLO MATRIX WITH THROMBIN KIT: Type: IMPLANTABLE DEVICE | Status: FUNCTIONAL

## 2023-10-03 RX ORDER — DEXAMETHASONE 0.5 MG/5ML
1 ELIXIR ORAL EVERY 6 HOURS
Refills: 0 | Status: CANCELLED | OUTPATIENT
Start: 2023-10-06 | End: 2023-10-04

## 2023-10-03 RX ORDER — ACETAMINOPHEN 500 MG
650 TABLET ORAL EVERY 6 HOURS
Refills: 0 | Status: DISCONTINUED | OUTPATIENT
Start: 2023-10-04 | End: 2023-10-04

## 2023-10-03 RX ORDER — ASPIRIN/CALCIUM CARB/MAGNESIUM 324 MG
81 TABLET ORAL AT BEDTIME
Refills: 0 | Status: DISCONTINUED | OUTPATIENT
Start: 2023-10-04 | End: 2023-10-04

## 2023-10-03 RX ORDER — SALIVA SUBSTITUTE COMB NO.11 351 MG
5 POWDER IN PACKET (EA) MUCOUS MEMBRANE
Refills: 0 | Status: DISCONTINUED | OUTPATIENT
Start: 2023-10-03 | End: 2023-10-04

## 2023-10-03 RX ORDER — DEXAMETHASONE 0.5 MG/5ML
ELIXIR ORAL
Refills: 0 | Status: DISCONTINUED | OUTPATIENT
Start: 2023-10-04 | End: 2023-10-04

## 2023-10-03 RX ORDER — CALCIUM CARBONATE 500(1250)
0 TABLET ORAL
Refills: 0 | DISCHARGE

## 2023-10-03 RX ORDER — SODIUM CHLORIDE 9 MG/ML
1000 INJECTION, SOLUTION INTRAVENOUS
Refills: 0 | Status: DISCONTINUED | OUTPATIENT
Start: 2023-10-03 | End: 2023-10-04

## 2023-10-03 RX ORDER — LIDOCAINE HCL 20 MG/ML
0.2 VIAL (ML) INJECTION ONCE
Refills: 0 | Status: DISCONTINUED | OUTPATIENT
Start: 2023-10-03 | End: 2023-10-03

## 2023-10-03 RX ORDER — SODIUM CHLORIDE 9 MG/ML
1000 INJECTION, SOLUTION INTRAVENOUS
Refills: 0 | Status: DISCONTINUED | OUTPATIENT
Start: 2023-10-03 | End: 2023-10-03

## 2023-10-03 RX ORDER — MAGNESIUM HYDROXIDE 400 MG/1
30 TABLET, CHEWABLE ORAL EVERY 12 HOURS
Refills: 0 | Status: DISCONTINUED | OUTPATIENT
Start: 2023-10-03 | End: 2023-10-04

## 2023-10-03 RX ORDER — TRAMADOL HYDROCHLORIDE 50 MG/1
50 TABLET ORAL EVERY 6 HOURS
Refills: 0 | Status: DISCONTINUED | OUTPATIENT
Start: 2023-10-03 | End: 2023-10-04

## 2023-10-03 RX ORDER — INSULIN LISPRO 100/ML
VIAL (ML) SUBCUTANEOUS AT BEDTIME
Refills: 0 | Status: DISCONTINUED | OUTPATIENT
Start: 2023-10-03 | End: 2023-10-03

## 2023-10-03 RX ORDER — CHLORTHALIDONE 50 MG
25 TABLET ORAL DAILY
Refills: 0 | Status: DISCONTINUED | OUTPATIENT
Start: 2023-10-04 | End: 2023-10-04

## 2023-10-03 RX ORDER — DEXTROSE 50 % IN WATER 50 %
25 SYRINGE (ML) INTRAVENOUS ONCE
Refills: 0 | Status: DISCONTINUED | OUTPATIENT
Start: 2023-10-03 | End: 2023-10-04

## 2023-10-03 RX ORDER — DEXAMETHASONE 0.5 MG/5ML
2 ELIXIR ORAL EVERY 6 HOURS
Refills: 0 | Status: CANCELLED | OUTPATIENT
Start: 2023-10-05 | End: 2023-10-04

## 2023-10-03 RX ORDER — METHOTREXATE 2.5 MG/1
12.5 TABLET ORAL
Refills: 0 | Status: DISCONTINUED | OUTPATIENT
Start: 2023-10-03 | End: 2023-10-04

## 2023-10-03 RX ORDER — FENTANYL CITRATE 50 UG/ML
25 INJECTION INTRAVENOUS
Refills: 0 | Status: DISCONTINUED | OUTPATIENT
Start: 2023-10-03 | End: 2023-10-03

## 2023-10-03 RX ORDER — CEFAZOLIN SODIUM 1 G
2000 VIAL (EA) INJECTION ONCE
Refills: 0 | Status: COMPLETED | OUTPATIENT
Start: 2023-10-03 | End: 2023-10-03

## 2023-10-03 RX ORDER — CARVEDILOL PHOSPHATE 80 MG/1
1 CAPSULE, EXTENDED RELEASE ORAL
Refills: 0 | DISCHARGE

## 2023-10-03 RX ORDER — ATORVASTATIN CALCIUM 80 MG/1
40 TABLET, FILM COATED ORAL AT BEDTIME
Refills: 0 | Status: DISCONTINUED | OUTPATIENT
Start: 2023-10-03 | End: 2023-10-04

## 2023-10-03 RX ORDER — CEFAZOLIN SODIUM 1 G
2000 VIAL (EA) INJECTION EVERY 8 HOURS
Refills: 0 | Status: COMPLETED | OUTPATIENT
Start: 2023-10-03 | End: 2023-10-04

## 2023-10-03 RX ORDER — HYDRALAZINE HCL 50 MG
50 TABLET ORAL
Refills: 0 | Status: DISCONTINUED | OUTPATIENT
Start: 2023-10-03 | End: 2023-10-04

## 2023-10-03 RX ORDER — SENNA PLUS 8.6 MG/1
2 TABLET ORAL AT BEDTIME
Refills: 0 | Status: DISCONTINUED | OUTPATIENT
Start: 2023-10-03 | End: 2023-10-04

## 2023-10-03 RX ORDER — DEXAMETHASONE 0.5 MG/5ML
4 ELIXIR ORAL EVERY 6 HOURS
Refills: 0 | Status: DISCONTINUED | OUTPATIENT
Start: 2023-10-03 | End: 2023-10-04

## 2023-10-03 RX ORDER — METHOTREXATE 2.5 MG/1
5 TABLET ORAL
Refills: 0 | DISCHARGE

## 2023-10-03 RX ORDER — TIZANIDINE 4 MG/1
2 TABLET ORAL EVERY 8 HOURS
Refills: 0 | Status: DISCONTINUED | OUTPATIENT
Start: 2023-10-03 | End: 2023-10-04

## 2023-10-03 RX ORDER — INFLUENZA VIRUS VACCINE 15; 15; 15; 15 UG/.5ML; UG/.5ML; UG/.5ML; UG/.5ML
0.7 SUSPENSION INTRAMUSCULAR ONCE
Refills: 0 | Status: DISCONTINUED | OUTPATIENT
Start: 2023-10-03 | End: 2023-10-04

## 2023-10-03 RX ORDER — ROSUVASTATIN CALCIUM 5 MG/1
1 TABLET ORAL
Refills: 0 | DISCHARGE

## 2023-10-03 RX ORDER — DEXAMETHASONE 0.5 MG/5ML
3 ELIXIR ORAL EVERY 6 HOURS
Refills: 0 | Status: DISCONTINUED | OUTPATIENT
Start: 2023-10-04 | End: 2023-10-04

## 2023-10-03 RX ORDER — SODIUM CHLORIDE 9 MG/ML
3 INJECTION INTRAMUSCULAR; INTRAVENOUS; SUBCUTANEOUS EVERY 8 HOURS
Refills: 0 | Status: DISCONTINUED | OUTPATIENT
Start: 2023-10-03 | End: 2023-10-03

## 2023-10-03 RX ORDER — PANTOPRAZOLE SODIUM 20 MG/1
40 TABLET, DELAYED RELEASE ORAL
Refills: 0 | Status: DISCONTINUED | OUTPATIENT
Start: 2023-10-03 | End: 2023-10-04

## 2023-10-03 RX ORDER — GLUCAGON INJECTION, SOLUTION 0.5 MG/.1ML
1 INJECTION, SOLUTION SUBCUTANEOUS ONCE
Refills: 0 | Status: DISCONTINUED | OUTPATIENT
Start: 2023-10-03 | End: 2023-10-04

## 2023-10-03 RX ORDER — SODIUM CHLORIDE 9 MG/ML
1000 INJECTION INTRAMUSCULAR; INTRAVENOUS; SUBCUTANEOUS
Refills: 0 | Status: DISCONTINUED | OUTPATIENT
Start: 2023-10-03 | End: 2023-10-03

## 2023-10-03 RX ORDER — CARVEDILOL PHOSPHATE 80 MG/1
12.5 CAPSULE, EXTENDED RELEASE ORAL EVERY 12 HOURS
Refills: 0 | Status: DISCONTINUED | OUTPATIENT
Start: 2023-10-03 | End: 2023-10-04

## 2023-10-03 RX ORDER — DEXTROSE 50 % IN WATER 50 %
12.5 SYRINGE (ML) INTRAVENOUS ONCE
Refills: 0 | Status: DISCONTINUED | OUTPATIENT
Start: 2023-10-03 | End: 2023-10-04

## 2023-10-03 RX ORDER — INSULIN LISPRO 100/ML
VIAL (ML) SUBCUTANEOUS
Refills: 0 | Status: DISCONTINUED | OUTPATIENT
Start: 2023-10-03 | End: 2023-10-03

## 2023-10-03 RX ORDER — DEXTROSE 50 % IN WATER 50 %
15 SYRINGE (ML) INTRAVENOUS ONCE
Refills: 0 | Status: DISCONTINUED | OUTPATIENT
Start: 2023-10-03 | End: 2023-10-04

## 2023-10-03 RX ORDER — ACETAMINOPHEN 500 MG
1000 TABLET ORAL ONCE
Refills: 0 | Status: COMPLETED | OUTPATIENT
Start: 2023-10-03 | End: 2023-10-03

## 2023-10-03 RX ORDER — INSULIN GLARGINE 100 [IU]/ML
14 INJECTION, SOLUTION SUBCUTANEOUS AT BEDTIME
Refills: 0 | Status: DISCONTINUED | OUTPATIENT
Start: 2023-10-03 | End: 2023-10-03

## 2023-10-03 RX ORDER — CHLORHEXIDINE GLUCONATE 213 G/1000ML
1 SOLUTION TOPICAL ONCE
Refills: 0 | Status: DISCONTINUED | OUTPATIENT
Start: 2023-10-03 | End: 2023-10-03

## 2023-10-03 RX ORDER — FOLIC ACID 0.8 MG
1 TABLET ORAL
Refills: 0 | DISCHARGE

## 2023-10-03 RX ORDER — ONDANSETRON 8 MG/1
4 TABLET, FILM COATED ORAL ONCE
Refills: 0 | Status: DISCONTINUED | OUTPATIENT
Start: 2023-10-03 | End: 2023-10-03

## 2023-10-03 RX ORDER — ACETAMINOPHEN 500 MG
975 TABLET ORAL EVERY 8 HOURS
Refills: 0 | Status: DISCONTINUED | OUTPATIENT
Start: 2023-10-03 | End: 2023-10-03

## 2023-10-03 RX ORDER — AZILSARTAN KAMEDOXOMIL AND CHLORTHALIDONE 40; 12.5 MG/1; MG/1
1 TABLET ORAL
Refills: 0 | DISCHARGE

## 2023-10-03 RX ORDER — CHLORTHALIDONE 50 MG
25 TABLET ORAL ONCE
Refills: 0 | Status: COMPLETED | OUTPATIENT
Start: 2023-10-03 | End: 2023-10-03

## 2023-10-03 RX ORDER — ASPIRIN/CALCIUM CARB/MAGNESIUM 324 MG
1 TABLET ORAL
Refills: 0 | DISCHARGE

## 2023-10-03 RX ORDER — VALSARTAN 80 MG/1
160 TABLET ORAL DAILY
Refills: 0 | Status: DISCONTINUED | OUTPATIENT
Start: 2023-10-04 | End: 2023-10-04

## 2023-10-03 RX ADMIN — Medication 4 MILLIGRAM(S): at 23:30

## 2023-10-03 RX ADMIN — SODIUM CHLORIDE 90 MILLILITER(S): 9 INJECTION INTRAMUSCULAR; INTRAVENOUS; SUBCUTANEOUS at 16:56

## 2023-10-03 RX ADMIN — Medication 1000 MILLIGRAM(S): at 22:17

## 2023-10-03 RX ADMIN — Medication 2: at 16:22

## 2023-10-03 RX ADMIN — CARVEDILOL PHOSPHATE 12.5 MILLIGRAM(S): 80 CAPSULE, EXTENDED RELEASE ORAL at 20:09

## 2023-10-03 RX ADMIN — SENNA PLUS 2 TABLET(S): 8.6 TABLET ORAL at 21:18

## 2023-10-03 RX ADMIN — Medication 100 MILLIGRAM(S): at 21:18

## 2023-10-03 RX ADMIN — Medication 25 MILLIGRAM(S): at 20:09

## 2023-10-03 RX ADMIN — Medication 400 MILLIGRAM(S): at 21:17

## 2023-10-03 RX ADMIN — Medication 50 MILLIGRAM(S): at 18:25

## 2023-10-03 RX ADMIN — ATORVASTATIN CALCIUM 40 MILLIGRAM(S): 80 TABLET, FILM COATED ORAL at 21:18

## 2023-10-03 RX ADMIN — Medication 4 MILLIGRAM(S): at 18:25

## 2023-10-03 RX ADMIN — TIZANIDINE 2 MILLIGRAM(S): 4 TABLET ORAL at 21:18

## 2023-10-03 NOTE — CHART NOTE - NSCHARTNOTEFT_GEN_A_CORE
POC    Patient Resting without complaints.   Wife @ bedside   No Chest Pain, SOB, N/V.    Pre op s/sx : LLE radiculopathy   ; Post op, patient reports:   improvement    Exam:   Alert/Oriented, No Acute Distress  Pulm: CTAB  BACK:          Dressing: [x ] clean/dry/intact           Drains: : None          Sensation: [x ] intact to light touch         Motor exam: [ x ]                      [x ] Lower extremity                     PF          DF         EHL       FHL                                                                                            R        5/5        5/5        5/5       5/5                                                        L         5/5        5/5        5/5       5/5             SLR:  L < R                                                              Calves Soft/Non-tender bilaterally           [x ] warm well perfused; capillary refill <3 seconds              LABS:    10-03    134<L>  |  100  |  36<H>  ----------------------------<  179<H>  5.2   |  21<L>  |  1.11      A/P :  88y Male s/p Decompression, lumbar spine L4-5      -    Pain control  -    Taper         Lantus 14U QHS RESTARTED [takes Tresiba @ home + sliding scale]         Monitor BGLs carefully   -    DVT ppx: SCDs       -    Physical Therapy  -    Weight bearing status: WBAT [x ]         -    Home Rx CONFIRMED & continued         * Coreg 6.25 mg BID         - Hydralazine 50mg BID          - Edarbyclor 40-25mg QD -- Patient's wife to bring in  (currently NOT on formulary)         **  ASA 81mg (previous stent) & Methotrexate 12.5mg Q Wednesday HELD for NOW               -- Will d/w Attending         ** Prednisone { 2mg Qam & 1 mg QHS ---h/o of RA }               -- HELD while on Decadron TAPER  -    Dispo:  Anticipate home           ***See Above  Ady SALMON  Orthopedics  B: [phone] POC    Patient Resting without complaints.   Wife @ bedside   No Chest Pain, SOB, N/V.    Pre op s/sx : LLE radiculopathy   ; Post op, patient reports:   improvement    Exam:   Alert/Oriented, No Acute Distress  Pulm: CTAB  BACK:          Dressing: [x ] clean/dry/intact           Drains: : None          Sensation: [x ] intact to light touch         Motor exam: [ x ]                      [x ] Lower extremity                     PF          DF         EHL       FHL                                                                                            R        5/5        5/5        5/5       5/5                                                        L         5/5        5/5        5/5       5/5             SLR:  L < R                                                              Calves Soft/Non-tender bilaterally            1-2+ pedal edema noted           [x ] warm well perfused; capillary refill <3 seconds              LABS:    10-03    134<L>  |  100  |  36<H>  ----------------------------<  179<H>  5.2   |  21<L>  |  1.11      A/P :  88y Male s/p Decompression, lumbar spine L4-5      -    Pain control          No Toradol 2/2 age & h/o CKD  -    Taper         Lantus 14U QHS RESTARTED [takes Tresiba @ home + sliding scale]         Monitor BGLs carefully   -    DVT ppx: SCDs       -    Physical Therapy  -    Weight bearing status: WBAT [x ]         -    Home Rx CONFIRMED & continued         * Coreg 6.25 mg BID         - Hydralazine 50mg BID          - Edarbyclor 40-25mg QD -- Patient's wife to bring in (currently NOT on formulary)         **  ASA 81mg (previous stent) & Methotrexate 12.5mg Q Wednesday HELD for NOW               -- Will d/w Attending         ** Prednisone { 2mg Qam & 1 mg QHS ---h/o of RA }               -- HELD while on Decadron TAPER  -    Dispo:  Anticipate home           ***See Above  Ady SALMON  Orthopedics  B: [phone]

## 2023-10-03 NOTE — CHART NOTE - NSCHARTNOTEFT_GEN_A_CORE
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 Endocrine called overnight because patient is requesting to manage his T1DM himself and use his own insulin pens      --Patient has T1DM for > 50 years, last A1c 6.1%, Endocrinologist: Dr. Stover.    --Home meds: Tresiba 14 units qhs, Humalog 10 units with breakfast and lunch, 14 units with dinner. Also follows an correctial scale of 1:20 with a target of 100.    --patient is AOx4 and is comfortable managing his insulin himself   --patient has all insulin/diabetes supplies with him   --uses Dexcom G6 and has one implanted at the moment for continuous glucose monitoring.          Recommendations:   --if nursing staff and primary team are okay with patient managing insulin himself – there is no contraindication from endocrine perspective for patient to use his own insulin and manage it himself since patient is AOx4   --nursing staff should still check FS qac and qhs via our glucometer   --if patient becomes altered, nursing staff and primary team should take over diabetes care   --since glucose targets are more liberal inpateint (140-180) would recommend 12 units of Tresiba tonight instead of 14 units home dose     Discussed case w/ attending Dr. Manohar Gann MD  Endocrine Fellow  Can be reached via [employer] teams.    For follow up questions, discharge recommendations, or new consults, please email [email or web address] ([email or web address]) or [email or web address] ([facility]) or call answering service at [phone] (weekdays); [phone] (nights/weekends).  For emergiencies please page fellow on call. 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 Endocrine called overnight because patient is requesting to manage his T1DM himself and use his own insulin pens      --Patient has T1DM for > 50 years, last A1c 6.1%, Endocrinologist: Dr. Stover.    --Home meds: Tresiba 14 units qhs, Humalog 10 units with breakfast and lunch, 14 units with dinner. Also follows an correctial scale of 1:20 with a target of 100.    --patient is AOx4 and is comfortable managing his insulin himself   --patient has all insulin/diabetes supplies with him   --uses Dexcom G6 and has one implanted at the moment for continuous glucose monitoring.          Recommendations:   --if nursing staff and primary team are okay with patient managing insulin himself – there is no contraindication from endocrine perspective for patient to use his own insulin and manage it himself since patient is AOx4   --nursing staff should still check FS qac and qhs via our glucometer   --if patient becomes altered, nursing staff and primary team should take over diabetes care   --since glucose targets are more liberal inpateint (140-180) would recommend 12 units of Tresiba tonight instead of 14 units home dose  --discussed the risks/benefits of patient self managing his own insulin/diabetes, patient agreed to manage it himself  --it is necessary for patient to communicate with nursing staff for all insulin injections/boluses/corrections       Discussed case w/ attending Dr. Manohar Gann MD  Endocrine Fellow  Can be reached via [employer] teams.    For follow up questions, discharge recommendations, or new consults, please email [email or web address] ([email or web address]) or [email or web address] ([facility]) or call answering service at [phone] (weekdays); [phone] (nights/weekends).  For emergiencies please page fellow on call.

## 2023-10-03 NOTE — PHYSICAL THERAPY INITIAL EVALUATION ADULT - PERTINENT HX OF CURRENT PROBLEM, REHAB EVAL
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 He reports worsen lower back pain x 2 years radiating to his lower extremities affecting his daily activities, with difficulty walking. He added "have had epidural in the past with little relief (>1 year ago), had an unsuccessful spinal cord stimulator trial, currently on prednisone for rheumatoid arthritis, says also help a little with the back pain. His medical history also significant for low grade MDS dx 2022, anemia of chronic disease, autoimmune dz dx after receiving pneumonia vaccine in 2016, T1DM dx age 37 (Dexcon glucose sensor in place, last A1c 5.7), prostate cancer dx 2006 (had seed implantation and radiation therapy), Aortic valve stenosis s/p TAVR 10/2021, CAD s/p PCI with stenting 09/2021, occasional heartburn, sciatica nerve pain, [place] (bilateral hearing aids).    Now s/p L4-5 decompression 10/3

## 2023-10-03 NOTE — CHART NOTE - NSCHARTNOTEFT_GEN_A_CORE
CAPRINI SCORE [CLOT updated 18]    AGE RELATED RISK FACTORS                                                       MOBILITY RELATED FACTORS  [ ] Age 41-60 years                                            (1 Point)                    [ ] Bed rest                                                        (1 Point)  [ ] Age: 61-74 years                                           (2 Points)                  [ ] Plaster cast                                                   (2 Points)  [x ] Age= 75 years                                              (3 Points)                    [ ] Bed bound for more than 72 hours                 (2 Points)    DISEASE RELATED RISK FACTORS                                               GENDER SPECIFIC FACTORS  [ ] Edema in the lower extremities                       (1 Point)              [ ] Pregnancy                                                     (1 Point)  [ ] Varicose veins                                               (1 Point)                     [ ] Post-partum < 6 weeks                                   (1 Point)             [ ] BMI > 25 Kg/m2                                            (1 Point)                     [ ] Hormonal therapy  or oral contraception          (1 Point)                 [ ] Sepsis (in the previous month)                        (1 Point)               [ ] History of pregnancy complications                 (1 point)  [ ] Pneumonia or serious lung disease                                               [ ] Unexplained or recurrent                     (1 Point)           (in the previous month)                               (1 Point)  [ ] Abnormal pulmonary function test                     (1 Point)                 SURGERY RELATED RISK FACTORS  [ ] Acute myocardial infarction                              (1 Point)               [ ]  Section                                             (1 Point)  [ ] Congestive heart failure (in the previous month)  (1 Point)      [ ] Minor surgery                                                  (1 Point)   [ ] Inflammatory bowel disease                             (1 Point)               [ ] Arthroscopic surgery                                        (2 Points)  [ ] Central venous access                                      (2 Points)                [x ] General surgery lasting more than 45 minutes (2 points)  [ x] Present or previous malignancy                     (2 Points)                [ ] Elective arthroplasty                                         (5 points)    [ ] Stroke (in the previous month)                          (5 Points)                                                                                                                                                           HEMATOLOGY RELATED FACTORS                                                 TRAUMA RELATED RISK FACTORS  [ ] Prior episodes of VTE                                     (3 Points)                [ ] Fracture of the hip, pelvis, or leg                       (5 Points)  [ ] Positive family history for VTE                         (3 Points)             [ ] Acute spinal cord injury (in the previous month)  (5 Points)  [ ] Prothrombin 44879 A                                     (3 Points)               [ ] Paralysis  (less than 1 month)                             (5 Points)  [ ] Factor V Leiden                                             (3 Points)                  [ ] Multiple Trauma within 1 month                        (5 Points)  [ ] Lupus anticoagulants                                     (3 Points)                                                           [ ] Anticardiolipin antibodies                               (3 Points)                                                       [ ] High homocysteine in the blood                      (3 Points)                                             [ ] Other congenital or acquired thrombophilia      (3 Points)                                                [ ] Heparin induced thrombocytopenia                  (3 Points)                                     Total Score [7 ]    Abraham Bernal PA-C  Orthopedic Surgery  Pager #[phone]

## 2023-10-03 NOTE — PHYSICAL THERAPY INITIAL EVALUATION ADULT - ADDITIONAL COMMENTS
Pt lives in an apt with wife. Has no stairs to enter and +elevator inside. Reports being independent with functional mobility/ADLs without AD. +drive

## 2023-10-04 ENCOUNTER — TRANSCRIPTION ENCOUNTER (OUTPATIENT)
Age: 88
End: 2023-10-04

## 2023-10-04 VITALS
DIASTOLIC BLOOD PRESSURE: 61 MMHG | OXYGEN SATURATION: 96 % | HEART RATE: 58 BPM | TEMPERATURE: 98 F | RESPIRATION RATE: 18 BRPM | SYSTOLIC BLOOD PRESSURE: 148 MMHG

## 2023-10-04 LAB
ANION GAP SERPL CALC-SCNC: 12 MMOL/L — SIGNIFICANT CHANGE UP (ref 5–17)
BUN SERPL-MCNC: 41 MG/DL — HIGH (ref 7–23)
CALCIUM SERPL-MCNC: 8.9 MG/DL — SIGNIFICANT CHANGE UP (ref 8.4–10.5)
CHLORIDE SERPL-SCNC: 100 MMOL/L — SIGNIFICANT CHANGE UP (ref 96–108)
CO2 SERPL-SCNC: 22 MMOL/L — SIGNIFICANT CHANGE UP (ref 22–31)
CREAT SERPL-MCNC: 1.26 MG/DL — SIGNIFICANT CHANGE UP (ref 0.5–1.3)
EGFR: 56 ML/MIN/1.73M2 — LOW
GLUCOSE BLDC GLUCOMTR-MCNC: 143 MG/DL — HIGH (ref 70–99)
GLUCOSE BLDC GLUCOMTR-MCNC: 176 MG/DL — HIGH (ref 70–99)
GLUCOSE SERPL-MCNC: 161 MG/DL — HIGH (ref 70–99)
HCT VFR BLD CALC: 29.4 % — LOW (ref 39–50)
HGB BLD-MCNC: 9.8 G/DL — LOW (ref 13–17)
MCHC RBC-ENTMCNC: 33.3 GM/DL — SIGNIFICANT CHANGE UP (ref 32–36)
MCHC RBC-ENTMCNC: 33.3 PG — SIGNIFICANT CHANGE UP (ref 27–34)
MCV RBC AUTO: 100 FL — SIGNIFICANT CHANGE UP (ref 80–100)
NRBC # BLD: 0 /100 WBCS — SIGNIFICANT CHANGE UP (ref 0–0)
PLATELET # BLD AUTO: 169 K/UL — SIGNIFICANT CHANGE UP (ref 150–400)
POTASSIUM SERPL-MCNC: 4.7 MMOL/L — SIGNIFICANT CHANGE UP (ref 3.5–5.3)
POTASSIUM SERPL-SCNC: 4.7 MMOL/L — SIGNIFICANT CHANGE UP (ref 3.5–5.3)
RBC # BLD: 2.94 M/UL — LOW (ref 4.2–5.8)
RBC # FLD: 13.8 % — SIGNIFICANT CHANGE UP (ref 10.3–14.5)
SODIUM SERPL-SCNC: 134 MMOL/L — LOW (ref 135–145)
WBC # BLD: 8.97 K/UL — SIGNIFICANT CHANGE UP (ref 3.8–10.5)
WBC # FLD AUTO: 8.97 K/UL — SIGNIFICANT CHANGE UP (ref 3.8–10.5)

## 2023-10-04 PROCEDURE — 84132 ASSAY OF SERUM POTASSIUM: CPT

## 2023-10-04 PROCEDURE — 69990 MICROSURGERY ADD-ON: CPT

## 2023-10-04 PROCEDURE — 97161 PT EVAL LOW COMPLEX 20 MIN: CPT

## 2023-10-04 PROCEDURE — C9399: CPT

## 2023-10-04 PROCEDURE — 93970 EXTREMITY STUDY: CPT

## 2023-10-04 PROCEDURE — C1889: CPT

## 2023-10-04 PROCEDURE — 63005 REMOVE SPINE LAMINA 1/2 LMBR: CPT

## 2023-10-04 PROCEDURE — 80048 BASIC METABOLIC PNL TOTAL CA: CPT

## 2023-10-04 PROCEDURE — 85027 COMPLETE CBC AUTOMATED: CPT

## 2023-10-04 PROCEDURE — 97116 GAIT TRAINING THERAPY: CPT

## 2023-10-04 PROCEDURE — 82962 GLUCOSE BLOOD TEST: CPT

## 2023-10-04 PROCEDURE — 76000 FLUOROSCOPY <1 HR PHYS/QHP: CPT

## 2023-10-04 RX ORDER — OMEPRAZOLE 10 MG/1
1 CAPSULE, DELAYED RELEASE ORAL
Refills: 0 | DISCHARGE

## 2023-10-04 RX ORDER — PANTOPRAZOLE SODIUM 20 MG/1
40 TABLET, DELAYED RELEASE ORAL ONCE
Refills: 0 | Status: COMPLETED | OUTPATIENT
Start: 2023-10-04 | End: 2023-10-04

## 2023-10-04 RX ORDER — DOCUSATE SODIUM 100 MG
1 CAPSULE ORAL
Qty: 60 | Refills: 0
Start: 2023-10-04 | End: 2023-11-02

## 2023-10-04 RX ORDER — ACETAMINOPHEN 500 MG
2 TABLET ORAL
Qty: 0 | Refills: 0 | DISCHARGE
Start: 2023-10-04

## 2023-10-04 RX ORDER — ACETAMINOPHEN 500 MG
2 TABLET ORAL
Qty: 56 | Refills: 0
Start: 2023-10-04 | End: 2023-10-10

## 2023-10-04 RX ORDER — TIZANIDINE 4 MG/1
1 TABLET ORAL
Qty: 42 | Refills: 0
Start: 2023-10-04 | End: 2023-10-17

## 2023-10-04 RX ORDER — INSULIN DEGLUDEC 100 U/ML
14 INJECTION, SOLUTION SUBCUTANEOUS
Qty: 0 | Refills: 0 | DISCHARGE

## 2023-10-04 RX ORDER — TRAMADOL HYDROCHLORIDE 50 MG/1
1 TABLET ORAL
Qty: 28 | Refills: 0
Start: 2023-10-04 | End: 2023-10-10

## 2023-10-04 RX ORDER — HYDRALAZINE HCL 50 MG
1 TABLET ORAL
Qty: 0 | Refills: 0 | DISCHARGE

## 2023-10-04 RX ORDER — NALOXONE HYDROCHLORIDE 4 MG/.1ML
4 SPRAY NASAL
Qty: 1 | Refills: 0
Start: 2023-10-04

## 2023-10-04 RX ORDER — TRAMADOL HYDROCHLORIDE 50 MG/1
1 TABLET ORAL
Qty: 0 | Refills: 0 | DISCHARGE
Start: 2023-10-04

## 2023-10-04 RX ORDER — CARVEDILOL PHOSPHATE 80 MG/1
6.25 CAPSULE, EXTENDED RELEASE ORAL EVERY 12 HOURS
Refills: 0 | Status: DISCONTINUED | OUTPATIENT
Start: 2023-10-04 | End: 2023-10-04

## 2023-10-04 RX ORDER — SENNA PLUS 8.6 MG/1
2 TABLET ORAL
Qty: 60 | Refills: 0
Start: 2023-10-04 | End: 2023-11-02

## 2023-10-04 RX ORDER — TIZANIDINE 4 MG/1
1 TABLET ORAL
Qty: 0 | Refills: 0 | DISCHARGE
Start: 2023-10-04

## 2023-10-04 RX ORDER — SENNA PLUS 8.6 MG/1
2 TABLET ORAL
Qty: 0 | Refills: 0 | DISCHARGE
Start: 2023-10-04

## 2023-10-04 RX ORDER — OMEPRAZOLE 10 MG/1
1 CAPSULE, DELAYED RELEASE ORAL
Qty: 30 | Refills: 0
Start: 2023-10-04 | End: 2023-11-02

## 2023-10-04 RX ORDER — INSULIN LISPRO 100/ML
0 VIAL (ML) SUBCUTANEOUS
Qty: 0 | Refills: 0 | DISCHARGE

## 2023-10-04 RX ADMIN — Medication 650 MILLIGRAM(S): at 08:30

## 2023-10-04 RX ADMIN — TIZANIDINE 2 MILLIGRAM(S): 4 TABLET ORAL at 05:13

## 2023-10-04 RX ADMIN — VALSARTAN 160 MILLIGRAM(S): 80 TABLET ORAL at 05:14

## 2023-10-04 RX ADMIN — Medication 25 MILLIGRAM(S): at 05:13

## 2023-10-04 RX ADMIN — Medication 4 MILLIGRAM(S): at 05:12

## 2023-10-04 RX ADMIN — Medication 50 MILLIGRAM(S): at 05:13

## 2023-10-04 RX ADMIN — Medication 100 MILLIGRAM(S): at 05:13

## 2023-10-04 RX ADMIN — PANTOPRAZOLE SODIUM 40 MILLIGRAM(S): 20 TABLET, DELAYED RELEASE ORAL at 08:30

## 2023-10-04 NOTE — PROVIDER CONTACT NOTE (OTHER) - SITUATION
Pt is hypertensive 180/63, HR 62, asymptomatic.   Pt has pitting edema of +3 in the RLE with NS running at 90ml/hr.
Pt self administered 18 units of short acting insulin from his own insulin pen w/o notifying staff
no

## 2023-10-04 NOTE — PROVIDER CONTACT NOTE (OTHER) - BACKGROUND
Pt received his hydralizine PO before arriving to the floor from PACU  Patient takes a diuretic at home
see h and p

## 2023-10-04 NOTE — DISCHARGE NOTE PROVIDER - NSDCFUADDAPPT_GEN_ALL_CORE_FT
It is highly recommended you follow up with your primary care physician  within 4 weeks of discharge to discuss recent surgery/general checkup/possible  medication adjustment.

## 2023-10-04 NOTE — DISCHARGE NOTE PROVIDER - CARE PROVIDER_API CALL
Gopi Marley  Orthopaedic Surgery  [address], [address]  [place] [address]  Phone: [phone]  Fax: [phone]  Follow Up Time:

## 2023-10-04 NOTE — DISCHARGE NOTE PROVIDER - NSDCCPTREATMENT_GEN_ALL_CORE_FT
PRINCIPAL PROCEDURE  Procedure: Decompression, lumbar spine  Findings and Treatment: radiculopathy lumbar region     PRINCIPAL PROCEDURE  Procedure: Decompression, lumbar spine  Findings and Treatment: L4-S1 Laminectomy

## 2023-10-04 NOTE — DISCHARGE NOTE PROVIDER - NSDCFUSCHEDAPPT_GEN_ALL_CORE_FT
Gopi Marley  [facility] Physician [facility]  ORTHOSURG [address]  Scheduled Appointment: 10/16/2023    Gene Forbes  [facility] Physician [facility]  GASTRO [address]  Scheduled Appointment: 11/09/2023

## 2023-10-04 NOTE — DISCHARGE NOTE PROVIDER - NSDCFUADDINST_GEN_ALL_CORE_FT
Please call Dr. Marley' s office within next few days to schedule a follow up appointment after surgery.   Recommend follow up with medical MD, within next 4 weeks.   Dressing will be changed during office visit.   Patient may shower 5 days after surgery, limit direct water to dressing, if wet pat dry.   Out of bed, ambulate as tolerated-   Physical therapy to assist with exercise and help increase endurance.   Please contact Doctors office regarding arrangements for out patient Physical therapy.  Please followup with Dr. Marley on 10/16/23- please call office to confirm appointment.  Patient may shower 3 days after surgery with dressing on.  No tub baths or direct water to dressing. Blot dressing dry, no rubbing dressing.    Dressing to be removed by Dr. Marley on followup visit.  Ambulate as tolerated.  Please take pain medications as prescribed.

## 2023-10-04 NOTE — PROVIDER CONTACT NOTE (OTHER) - ACTION/TREATMENT ORDERED:
Provider Yamil Graham aware. Re educate patient on the need to notify staff of when and how much insulin he will be self injecting himself with.
Stop IVF  Administer home diuretic as per order  Reassess BP

## 2023-10-04 NOTE — DISCHARGE NOTE NURSING/CASE MANAGEMENT/SOCIAL WORK - PATIENT PORTAL LINK FT
You can access the FollowMyHealth Patient Portal offered by [facility] by registering at the following website: [email or web address]. By joining [facility]’s FollowMyHealth portal, you will also be able to view your health information using other applications (apps) compatible with our system.

## 2023-10-04 NOTE — PROGRESS NOTE ADULT - SUBJECTIVE AND OBJECTIVE BOX
ORTHO  Patient is a 88y old  Male who presents with a chief complaint of "I am having minimal invasive spine surgery, lumbar region, I have close to 2 years  of pain, sciatica, with difficulty walking."   Goal: "to be able to walk without been in pain."  Pain tolerance level on a scale of 1-10- 3 (26 Sep 2023 14:29)    Pt. resting without complaint    VS-  T(C): 36.8 (10-04-23 @ 04:45), Max: 36.8 (10-04-23 @ 04:45)  HR: 59 (10-04-23 @ 04:45) (51 - 79)  BP: 128/57 (10-04-23 @ 04:45) (109/51 - 180/63)  RR: 18 (10-04-23 @ 04:45) (14 - 20)  SpO2: 93% (10-04-23 @ 04:45) (92% - 100%)  Wt(kg): --    M.S. A & O  Lower back dressing- C/D/I  Neuro-              Motor- (+) Ankle & EHL- DF/PF 5/5              Sensation- grossly intact to light touch              Calves- soft, nontender- PAS           10-03    134<L>  |  100  |  36<H>  ----------------------------<  179<H>  5.2   |  21<L>  |  1.11    Ca    9.3      03 Oct 2023 16:27

## 2023-10-04 NOTE — PROVIDER CONTACT NOTE (OTHER) - ASSESSMENT
+3 pitting edema in the RLE
VSS. Pt denies any dizziness, lethargy, sweating, headache, shakiness. Pt states he feels "completely like himself". Blood sugar reading is 165 on LUE glucose monitor

## 2023-10-04 NOTE — PROGRESS NOTE ADULT - ASSESSMENT
Impression: Stable       Plan:   Continue present treatment                 Out of bed, ambulate as tolerated                 Physical therapy  follow up                 Continue to monitor    Isaac Borja PA-C  Orthopaedic Surgery  Team pager [phone]  [phone]

## 2023-10-04 NOTE — DISCHARGE NOTE PROVIDER - NSDCMRMEDTOKEN_GEN_ALL_CORE_FT
acetyl- l - carnitine:   alphalipoic acid 600 mg daily:   aspirin 81 mg oral tablet: 1 tab(s) orally once a day (at bedtime)  calcium:   carvedilol 6.25 mg oral tablet: 1 tab(s) orally 2 times a day  Edarbyclor 40 mg-25 mg oral tablet: 1 tab(s) orally once a day  folic acid 1 mg oral tablet: 1 tab(s) orally 6 days a week  HumaLOG 100 units/mL injectable solution: injectable slidding scale  hydrALAZINE 50 mg oral tablet: 1 tab(s) orally once a day  methotrexate 2.5 mg oral tablet: 5 orally  omeprazole 20 mg oral delayed release tablet: 1 tab(s) orally once a day (at bedtime)  predniSONE 1 mg oral tablet: 2 tab(s) orally  predniSONE 1 mg oral tablet: 1 tab(s) orally once a day (at bedtime)  probiotic:   rosuvastatin 10 mg oral tablet: 1 tab(s) orally once a day (at bedtime)  Tresiba 100 units/mL subcutaneous solution: subcutaneous once a day (at bedtime)  vitamin B12- 88276 mcg daily:   vitamin D3  4000 IU daily:    acetaminophen 325 mg oral tablet: 2 tab(s) orally every 6 hours  acetyl- l - carnitine:   alphalipoic acid 600 mg daily:   aspirin 81 mg oral tablet: 1 tab(s) orally once a day (at bedtime)  calcium:   carvedilol 6.25 mg oral tablet: 1 tab(s) orally 2 times a day  Edarbyclor 40 mg-25 mg oral tablet: 1 tab(s) orally once a day  folic acid 1 mg oral tablet: 1 tab(s) orally 6 days a week  HumaLOG 100 units/mL injectable solution: injectable slidding scale  hydrALAZINE 50 mg oral tablet: 1 tab(s) orally once a day  methotrexate 2.5 mg oral tablet: 5 orally  omeprazole 20 mg oral delayed release tablet: 1 tab(s) orally once a day (at bedtime)  probiotic:   rosuvastatin 10 mg oral tablet: 1 tab(s) orally once a day (at bedtime)  senna leaf extract oral tablet: 2 tab(s) orally once a day (at bedtime)  tiZANidine 2 mg oral tablet: 1 tab(s) orally every 8 hours  traMADol 50 mg oral tablet: 1 tab(s) orally every 6 hours As needed Severe Pain (7 - 10)  Tresiba 100 units/mL subcutaneous solution: subcutaneous once a day (at bedtime)  vitamin B12- 14408 mcg daily:   vitamin D3  4000 IU daily:    acetaminophen 325 mg oral tablet: 2 tab(s) orally every 6 hours X 5 days, then as needed for mild pain MDD: 4  acetyl- l - carnitine:   alphalipoic acid 600 mg daily:   aspirin 81 mg oral tablet: 1 tab(s) orally once a day (at bedtime)  calcium:   carvedilol 6.25 mg oral tablet: 1 tab(s) orally 2 times a day  docusate sodium 100 mg oral tablet: 1 tab(s) orally 2 times a day to prevent constipation if taking Tramadol (Stop if having diarrhea)  Edarbyclor 40 mg-25 mg oral tablet: 1 tab(s) orally once a day  folic acid 1 mg oral tablet: 1 tab(s) orally 6 days a week  HumaLOG 100 units/mL injectable solution: injectable 3 times a day (before meals) slidding scale  hydrALAZINE 50 mg oral tablet: 1 tab(s) orally 2 times a day  Medrol Dosepak 4 mg oral tablet: 1 tab(s) orally Please take as instructed on packet instructions  methotrexate 2.5 mg oral tablet: 5 orally  Narcan 4 mg/0.1 mL nasal spray: 4 milligram(s) intranasally every 2 to 3 minutes alternating between nostrils in event of narcotic overdose  omeprazole 20 mg oral delayed release tablet: 1 tab(s) orally once a day (at bedtime) before breakfast for gastrointestinal protection MDD: 1  probiotic:   rosuvastatin 10 mg oral tablet: 1 tab(s) orally once a day (at bedtime)  senna leaf extract oral tablet: 2 tab(s) orally once a day (at bedtime) to prevent constipation if taking Tramadol (Stop if having diarrhea) MDD: 1  tiZANidine 2 mg oral tablet: 1 tab(s) orally every 8 hours  tiZANidine 2 mg oral tablet: 1 tab(s) orally every 8 hours as needed for  muscle spasm MDD: 3  traMADol 50 mg oral tablet: 1 tab(s) orally every 6 hours as needed for Severe Pain (7 - 10) MDD: 4  traMADol 50 mg oral tablet: 1 tab(s) orally every 6 hours As needed Severe Pain (7 - 10)  Tresiba 100 units/mL subcutaneous solution: 14 solution subcutaneous once a day (at bedtime)  vitamin B12- 26101 mcg daily:   vitamin D3  4000 IU daily:    acetaminophen 325 mg oral tablet: 2 tab(s) orally every 6 hours X 5 days, then as needed for mild pain MDD: 4  acetyl- l - carnitine:   alphalipoic acid 600 mg daily:   aspirin 81 mg oral tablet: 1 tab(s) orally once a day (at bedtime)  calcium:   carvedilol 6.25 mg oral tablet: 1 tab(s) orally 2 times a day  docusate sodium 100 mg oral tablet: 1 tab(s) orally 2 times a day to prevent constipation if taking Tramadol (Stop if having diarrhea)  Edarbyclor 40 mg-25 mg oral tablet: 1 tab(s) orally once a day  folic acid 1 mg oral tablet: 1 tab(s) orally 6 days a week  HumaLOG 100 units/mL injectable solution: injectable 3 times a day (before meals) slidding scale  hydrALAZINE 50 mg oral tablet: 1 tab(s) orally 2 times a day  Medrol Dosepak 4 mg oral tablet: 1 tab(s) orally once a day Please take as instructed on packet instructions  methotrexate 2.5 mg oral tablet: 5 orally  Narcan 4 mg/0.1 mL nasal spray: 4 milligram(s) intranasally every 2 to 3 minutes alternating between nostrils in event of narcotic overdose  omeprazole 20 mg oral delayed release tablet: 1 tab(s) orally once a day (at bedtime) before breakfast for gastrointestinal protection MDD: 1  probiotic:   rosuvastatin 10 mg oral tablet: 1 tab(s) orally once a day (at bedtime)  senna leaf extract oral tablet: 2 tab(s) orally once a day (at bedtime) to prevent constipation if taking Tramadol (Stop if having diarrhea) MDD: 1  tiZANidine 2 mg oral tablet: 1 tab(s) orally every 8 hours as needed for  muscle spasm MDD: 3  traMADol 50 mg oral tablet: 1 tab(s) orally every 6 hours as needed for Severe Pain (7 - 10) MDD: 4  Tresiba 100 units/mL subcutaneous solution: 14 solution subcutaneous once a day (at bedtime)  vitamin B12- 88047 mcg daily:   vitamin D3  4000 IU daily:

## 2023-10-04 NOTE — DISCHARGE NOTE PROVIDER - HOSPITAL COURSE
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 He reports worsen lower back pain x 2 years radiating to his lower extremities affecting his daily activities, with difficulty walking. He added "have had epidural in the past with little relief (>1 year ago), had an unsuccessful spinal cord stimulator trial, currently on prednisone for rheumatoid arthritis, says also help a little with the back pain. His medical history also significant for low grade MDS dx 2022, anemia of chronic disease, autoimmune dz dx after receiving pneumonia vaccine in 2016, T1DM dx age 37 (Dexcon glucose sensor in place, last A1c 5.7), prostate cancer dx 2006 (had seed implantation and radiation therapy), Aortic valve stenosis s/p TAVR 10/2021, CAD s/p PCI with stenting 09/2021, occasional heartburn, sciatica nerve pain, [place] (bilateral hearing aids).    Goal: "to be able to walk without been in pain."    Allergies:        Allergies:  	No Known Allergies:      Past Medical, Past Surgical, and Family History:  PAST MEDICAL HISTORY:  Anemia of chronic disease   CAD (coronary artery disease)   Chronic kidney disease (CKD)   H/O aortic valve stenosis   H/O erectile dysfunction   History of autoimmune disease   History of heartburn   History of radiation therapy   History of rheumatoid arthritis   [place] (hard of hearing)   Hypertension   MDS (myelodysplastic syndrome), low grade   Prostate cancer   Sciatic nerve pain   T1DM (type 1 diabetes mellitus).     PAST SURGICAL HISTORY:  H/O cataract extraction   History of brachytherapy   History of colonoscopy   History of percutaneous coronary intervention   History of tonsillectomy   S/P TAVR (transcatheter aortic valve replacement).    10/3/23: Patient presents to the hospital for elective surgery, underwent a lumbar laminectomy L4-S1.  -tolerated procedure without complications.  Patient was evaluated by Physical therapy whom recommended home with out patient PT for discharge plan.  Patient is stable for discharge when cleared by PT.   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 He reports worsen lower back pain x 2 years radiating to his lower extremities affecting his daily activities, with difficulty walking. He added "have had epidural in the past with little relief (>1 year ago), had an unsuccessful spinal cord stimulator trial, currently on prednisone for rheumatoid arthritis, says also help a little with the back pain. His medical history also significant for low grade MDS dx 2022, anemia of chronic disease, autoimmune dz dx after receiving pneumonia vaccine in 2016, T1DM dx age 37 (Dexcon glucose sensor in place, last A1c 5.7), prostate cancer dx 2006 (had seed implantation and radiation therapy), Aortic valve stenosis s/p TAVR 10/2021, CAD s/p PCI with stenting 09/2021, occasional heartburn, sciatica nerve pain, [place] (bilateral hearing aids).    Goal: "to be able to walk without been in pain."    Allergies:        Allergies:  	No Known Allergies:      Past Medical, Past Surgical, and Family History:  PAST MEDICAL HISTORY:  Anemia of chronic disease   CAD (coronary artery disease)   Chronic kidney disease (CKD)   H/O aortic valve stenosis   H/O erectile dysfunction   History of autoimmune disease   History of heartburn   History of radiation therapy   History of rheumatoid arthritis   [place] (hard of hearing)   Hypertension   MDS (myelodysplastic syndrome), low grade   Prostate cancer   Sciatic nerve pain   T1DM (type 1 diabetes mellitus).     PAST SURGICAL HISTORY:  H/O cataract extraction   History of brachytherapy   History of colonoscopy   History of percutaneous coronary intervention   History of tonsillectomy   S/P TAVR (transcatheter aortic valve replacement).    10/3/23: Patient presents to the hospital for elective surgery, underwent a lumbar laminectomy L4-S1.  -tolerated procedure without complications.  Patient was evaluated by Physical therapy whom recommended home with out patient PT for discharge plan.  Patient is stable for discharge when cleared by PT.       No new neuro deficits post surgery. 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 He reports worsen lower back pain x 2 years radiating to his lower extremities affecting his daily activities, with difficulty walking. He added "have had epidural in the past with little relief (>1 year ago), had an unsuccessful spinal cord stimulator trial, currently on prednisone for rheumatoid arthritis, says also help a little with the back pain. His medical history also significant for low grade MDS dx 2022, anemia of chronic disease, autoimmune dz dx after receiving pneumonia vaccine in 2016, T1DM dx age 37 (Dexcon glucose sensor in place, last A1c 5.7), prostate cancer dx 2006 (had seed implantation and radiation therapy), Aortic valve stenosis s/p TAVR 10/2021, CAD s/p PCI with stenting 09/2021, occasional heartburn, sciatica nerve pain, [place] (bilateral hearing aids).    Goal: "to be able to walk without been in pain."    Allergies:        Allergies:  	No Known Allergies:      Past Medical, Past Surgical, and Family History:  PAST MEDICAL HISTORY:  Anemia of chronic disease   CAD (coronary artery disease)   Chronic kidney disease (CKD)   H/O aortic valve stenosis   H/O erectile dysfunction   History of autoimmune disease   History of heartburn   History of radiation therapy   History of rheumatoid arthritis   [place] (hard of hearing)   Hypertension   MDS (myelodysplastic syndrome), low grade   Prostate cancer   Sciatic nerve pain   T1DM (type 1 diabetes mellitus).     PAST SURGICAL HISTORY:  H/O cataract extraction   History of brachytherapy   History of colonoscopy   History of percutaneous coronary intervention   History of tonsillectomy   S/P TAVR (transcatheter aortic valve replacement).    Hospital Course:  10/3/23: Patient underwent a lumbar laminectomy L4-S1 without complications.  Patient was evaluated by Physical therapy whom recommended home with out patient PT for discharge plan.  Discharged to home when PT cleared with no new neurological deficits.         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 He reports worsen lower back pain x 2 years radiating to his lower extremities affecting his daily activities, with difficulty walking. He added "have had epidural in the past with little relief (>1 year ago), had an unsuccessful spinal cord stimulator trial, currently on prednisone for rheumatoid arthritis, says also help a little with the back pain. His medical history also significant for low grade MDS dx 2022, anemia of chronic disease, autoimmune dz dx after receiving pneumonia vaccine in 2016, T1DM dx age 37 (Dexcon glucose sensor in place, last A1c 5.7), prostate cancer dx 2006 (had seed implantation and radiation therapy), Aortic valve stenosis s/p TAVR 10/2021, CAD s/p PCI with stenting 09/2021, occasional heartburn, sciatica nerve pain, [place] (bilateral hearing aids).    Goal: "to be able to walk without been in pain."    Allergies:        Allergies:  	No Known Allergies:     Past Medical, Past Surgical, and Family History:  PAST MEDICAL HISTORY:  Anemia of chronic disease   CAD (coronary artery disease)   Chronic kidney disease (CKD)   H/O aortic valve stenosis   H/O erectile dysfunction   History of autoimmune disease   History of heartburn   History of radiation therapy   History of rheumatoid arthritis   [place] (hard of hearing)   Hypertension   MDS (myelodysplastic syndrome), low grade   Prostate cancer   Sciatic nerve pain   T1DM (type 1 diabetes mellitus).     PAST SURGICAL HISTORY:  H/O cataract extraction   History of brachytherapy   History of colonoscopy   History of percutaneous coronary intervention   History of tonsillectomy   S/P TAVR (transcatheter aortic valve replacement).    Hospital Course:  10/3/23: Patient underwent a lumbar laminectomy L4-S1 without complications.  Patient was evaluated by Physical therapy whom recommended home with out patient PT for discharge plan.  Discharged to home when PT cleared with no new neurological deficits.

## 2023-10-16 ENCOUNTER — APPOINTMENT (OUTPATIENT)
Dept: ORTHOPEDIC SURGERY | Facility: CLINIC | Age: 88
End: 2023-10-16
Payer: MEDICARE

## 2023-10-16 VITALS
DIASTOLIC BLOOD PRESSURE: 68 MMHG | SYSTOLIC BLOOD PRESSURE: 136 MMHG | WEIGHT: 140 LBS | HEIGHT: 66 IN | BODY MASS INDEX: 22.5 KG/M2

## 2023-10-16 DIAGNOSIS — M25.572 PAIN IN LEFT ANKLE AND JOINTS OF LEFT FOOT: ICD-10-CM

## 2023-10-16 PROBLEM — Z87.898 PERSONAL HISTORY OF OTHER SPECIFIED CONDITIONS: Chronic | Status: ACTIVE | Noted: 2023-09-26

## 2023-10-16 PROBLEM — H91.90 UNSPECIFIED HEARING LOSS, UNSPECIFIED EAR: Chronic | Status: ACTIVE | Noted: 2023-09-26

## 2023-10-16 PROBLEM — C61 MALIGNANT NEOPLASM OF PROSTATE: Chronic | Status: ACTIVE | Noted: 2023-09-26

## 2023-10-16 PROBLEM — M54.30 SCIATICA, UNSPECIFIED SIDE: Chronic | Status: ACTIVE | Noted: 2023-09-26

## 2023-10-16 PROBLEM — I10 ESSENTIAL (PRIMARY) HYPERTENSION: Chronic | Status: ACTIVE | Noted: 2023-09-26

## 2023-10-16 PROBLEM — Z87.438 PERSONAL HISTORY OF OTHER DISEASES OF MALE GENITAL ORGANS: Chronic | Status: ACTIVE | Noted: 2023-09-26

## 2023-10-16 PROBLEM — Z92.3 PERSONAL HISTORY OF IRRADIATION: Chronic | Status: ACTIVE | Noted: 2023-09-26

## 2023-10-16 PROBLEM — N18.9 CHRONIC KIDNEY DISEASE, UNSPECIFIED: Chronic | Status: ACTIVE | Noted: 2023-09-26

## 2023-10-16 PROBLEM — Z87.39 PERSONAL HISTORY OF OTHER DISEASES OF THE MUSCULOSKELETAL SYSTEM AND CONNECTIVE TISSUE: Chronic | Status: ACTIVE | Noted: 2023-09-26

## 2023-10-16 PROBLEM — E10.9 TYPE 1 DIABETES MELLITUS WITHOUT COMPLICATIONS: Chronic | Status: ACTIVE | Noted: 2023-09-26

## 2023-10-16 PROBLEM — D63.8 ANEMIA IN OTHER CHRONIC DISEASES CLASSIFIED ELSEWHERE: Chronic | Status: ACTIVE | Noted: 2023-09-26

## 2023-10-16 PROBLEM — Z86.79 PERSONAL HISTORY OF OTHER DISEASES OF THE CIRCULATORY SYSTEM: Chronic | Status: ACTIVE | Noted: 2023-09-26

## 2023-10-16 PROBLEM — I25.10 ATHEROSCLEROTIC HEART DISEASE OF NATIVE CORONARY ARTERY WITHOUT ANGINA PECTORIS: Chronic | Status: ACTIVE | Noted: 2023-09-26

## 2023-10-16 PROBLEM — Z86.2 PERSONAL HISTORY OF DISEASES OF THE BLOOD AND BLOOD-FORMING ORGANS AND CERTAIN DISORDERS INVOLVING THE IMMUNE MECHANISM: Chronic | Status: ACTIVE | Noted: 2023-09-26

## 2023-10-16 PROBLEM — D46.Z OTHER MYELODYSPLASTIC SYNDROMES: Chronic | Status: ACTIVE | Noted: 2023-09-26

## 2023-10-16 PROCEDURE — 99024 POSTOP FOLLOW-UP VISIT: CPT

## 2023-10-30 ENCOUNTER — APPOINTMENT (OUTPATIENT)
Dept: ORTHOPEDIC SURGERY | Facility: CLINIC | Age: 88
End: 2023-10-30
Payer: MEDICARE

## 2023-10-30 VITALS — WEIGHT: 140 LBS | BODY MASS INDEX: 22.5 KG/M2 | HEIGHT: 66 IN

## 2023-10-30 DIAGNOSIS — M51.36 OTHER INTERVERTEBRAL DISC DEGENERATION, LUMBAR REGION: ICD-10-CM

## 2023-10-30 PROCEDURE — 99024 POSTOP FOLLOW-UP VISIT: CPT

## 2023-10-31 ENCOUNTER — APPOINTMENT (OUTPATIENT)
Dept: PODIATRY | Facility: CLINIC | Age: 88
End: 2023-10-31
Payer: MEDICARE

## 2023-10-31 DIAGNOSIS — Z86.39 PERSONAL HISTORY OF OTHER ENDOCRINE, NUTRITIONAL AND METABOLIC DISEASE: ICD-10-CM

## 2023-10-31 DIAGNOSIS — R26.2 DIFFICULTY IN WALKING, NOT ELSEWHERE CLASSIFIED: ICD-10-CM

## 2023-10-31 DIAGNOSIS — M25.572 PAIN IN LEFT ANKLE AND JOINTS OF LEFT FOOT: ICD-10-CM

## 2023-10-31 PROCEDURE — 99203 OFFICE O/P NEW LOW 30 MIN: CPT

## 2023-11-01 PROBLEM — Z86.39 HISTORY OF TYPE 1 DIABETES MELLITUS: Status: RESOLVED | Noted: 2018-06-07 | Resolved: 2023-11-01

## 2023-11-01 PROBLEM — R26.2 DIFFICULTY WALKING: Status: ACTIVE | Noted: 2023-11-01

## 2023-11-06 ENCOUNTER — APPOINTMENT (OUTPATIENT)
Dept: ULTRASOUND IMAGING | Facility: IMAGING CENTER | Age: 88
End: 2023-11-06

## 2023-11-07 ENCOUNTER — APPOINTMENT (OUTPATIENT)
Dept: PODIATRY | Facility: CLINIC | Age: 88
End: 2023-11-07

## 2023-11-09 ENCOUNTER — APPOINTMENT (OUTPATIENT)
Dept: GASTROENTEROLOGY | Facility: CLINIC | Age: 88
End: 2023-11-09
Payer: MEDICARE

## 2023-11-09 VITALS
HEIGHT: 66 IN | SYSTOLIC BLOOD PRESSURE: 140 MMHG | BODY MASS INDEX: 22.98 KG/M2 | DIASTOLIC BLOOD PRESSURE: 52 MMHG | HEART RATE: 57 BPM | WEIGHT: 143 LBS

## 2023-11-09 DIAGNOSIS — E11.43 TYPE 2 DIABETES MELLITUS WITH DIABETIC AUTONOMIC (POLY)NEUROPATHY: ICD-10-CM

## 2023-11-09 DIAGNOSIS — K31.84 TYPE 2 DIABETES MELLITUS WITH DIABETIC AUTONOMIC (POLY)NEUROPATHY: ICD-10-CM

## 2023-11-09 DIAGNOSIS — K62.7 RADIATION PROCTITIS: ICD-10-CM

## 2023-11-09 DIAGNOSIS — K59.00 CONSTIPATION, UNSPECIFIED: ICD-10-CM

## 2023-11-09 PROCEDURE — 82274 ASSAY TEST FOR BLOOD FECAL: CPT | Mod: QW

## 2023-11-09 PROCEDURE — 99214 OFFICE O/P EST MOD 30 MIN: CPT

## 2023-11-09 RX ORDER — INSULIN LISPRO 100 [IU]/ML
100 INJECTION, SOLUTION INTRAVENOUS; SUBCUTANEOUS
Qty: 60 | Refills: 0 | Status: DISCONTINUED | COMMUNITY
Start: 2022-03-21 | End: 2023-11-09

## 2023-11-09 RX ORDER — AZILSARTAN KAMEDOXOMIL AND CHLORTHALIDONE 40; 25 MG/1; MG/1
40-25 TABLET ORAL DAILY
Refills: 0 | Status: ACTIVE | COMMUNITY
Start: 2023-11-09

## 2023-11-09 RX ORDER — SYRINGE DISPOSABLE IRRIG,70 ML
70 ML SYRINGE (EA) MISCELLANEOUS
Qty: 1 | Refills: 0 | Status: DISCONTINUED | COMMUNITY
Start: 2021-10-28 | End: 2023-11-09

## 2023-11-09 RX ORDER — MULTIVIT-MIN/IRON/FOLIC ACID/K 18-600-40
500 CAPSULE ORAL
Refills: 0 | Status: DISCONTINUED | COMMUNITY
End: 2023-11-09

## 2023-11-09 RX ORDER — FUROSEMIDE 20 MG/1
20 TABLET ORAL
Qty: 180 | Refills: 0 | Status: DISCONTINUED | COMMUNITY
Start: 2022-06-22 | End: 2023-11-09

## 2023-11-09 RX ORDER — SUCRALFATE 1 G/1
1 TABLET ORAL 4 TIMES DAILY
Qty: 360 | Refills: 0 | Status: DISCONTINUED | COMMUNITY
Start: 2021-10-28 | End: 2023-11-09

## 2023-11-09 RX ORDER — METHYLPREDNISOLONE 4 MG/1
4 TABLET ORAL
Qty: 1 | Refills: 1 | Status: DISCONTINUED | COMMUNITY
Start: 2023-05-17 | End: 2023-11-09

## 2023-11-09 RX ORDER — AMLODIPINE BESYLATE 2.5 MG/1
2.5 TABLET ORAL TWICE DAILY
Refills: 0 | Status: DISCONTINUED | COMMUNITY
End: 2023-11-09

## 2023-11-09 RX ORDER — METHYLPREDNISOLONE 4 MG/1
4 TABLET ORAL
Qty: 2 | Refills: 1 | Status: DISCONTINUED | COMMUNITY
Start: 2022-05-16 | End: 2023-11-09

## 2023-11-10 PROBLEM — K59.00 CONSTIPATION: Status: ACTIVE | Noted: 2023-11-10

## 2023-11-10 PROBLEM — E11.43 GASTROPARESIS DUE TO DM: Status: ACTIVE | Noted: 2022-05-18

## 2023-11-10 PROBLEM — K62.7 RADIATION PROCTITIS: Status: ACTIVE | Noted: 2018-06-07

## 2023-12-10 ENCOUNTER — NON-APPOINTMENT (OUTPATIENT)
Age: 88
End: 2023-12-10

## 2024-01-03 ENCOUNTER — NON-APPOINTMENT (OUTPATIENT)
Age: 89
End: 2024-01-03

## 2024-04-19 ENCOUNTER — NON-APPOINTMENT (OUTPATIENT)
Age: 89
End: 2024-04-19

## 2024-05-15 ENCOUNTER — APPOINTMENT (OUTPATIENT)
Dept: ORTHOPEDIC SURGERY | Facility: CLINIC | Age: 89
End: 2024-05-15
Payer: MEDICARE

## 2024-05-15 DIAGNOSIS — M54.16 RADICULOPATHY, LUMBAR REGION: ICD-10-CM

## 2024-05-15 PROCEDURE — 99215 OFFICE O/P EST HI 40 MIN: CPT

## 2024-05-15 NOTE — HISTORY OF PRESENT ILLNESS
[de-identified] : 88 yo male following up 7 months from his L4-L5 laminectomy/decompression on 10/3/2023. He reports not having relief from the laminectomy and has not played any pickleball. He has obtained other doctor opinions recommending input of spacer and other possible surgery. He has needed to sit a few times after walking a few 1000 ft. When he stands, he has pain down the left leg.  He has done a spine stimulator trial without significant improvement in his symptoms  10/30/2023 Patient is a 88 year old male who presents for follow up evaluation of his L4-L5 laminectomy/decompression approximately 4 weeks out. He reports he is not feeling better and is wearing a brace for relief of sxs.    10.16.2023 Today the patient states that overall he is doing well.  He is 2 weeks out from surgery.  He has had a significant improvement in his radicular complaints.  He only has mild buttock pain currently.  He is off all pain medications.  Overall he is pleased with his recovery to date.

## 2024-05-15 NOTE — ASSESSMENT
[FreeTextEntry1] : I had a long discussion with the patient in regards to his treatment plan and diagnosis. He does have lumbar radiculopathy, L4-l5 spinal stenosis and radiculopathy. In my opinion his MRI findings do match his clinical exam. He has tried and failed significant conservative management including epidural steroid injections, therapy, analgesics etc. Unfortunately their symptoms have persisted. Given his lack of improvement with conservative management, the fact that his imaging findings match his clinical exam I do think that he is an appropriate surgical candidate. We discussed a minimally invasive L4-L5 lumbar fusion. I did go over the surgical plan and described the postoperative recovery. All questions were answered today.   I did have a lengthy discussion with the patient in regards to risks of the procedure.  I did discuss the possibility that he may still have residual pain postoperatively.  He will think through his options and will let us know what he decides.

## 2024-05-15 NOTE — PHYSICAL EXAM
[de-identified] : Lumbar Physical Exam  Gait - Normal  Station - Normal  Sagittal balance - Normal  Compensatory mechanism? - None  Incision is clean dry and intact  Reflexes Patellar - normal Gastroc - normal Clonus - No  Hip Exam - Normal  Straight leg raise - none  Pulses - 2+ dp/pt  Range of motion - normal  Sensation  Sensation intact to light touch in L1, L2, L3, L4, L5 and S1 dermatomes bilaterally  Motor 	IP	Quad	HS	TA	Gastroc	EHL Right	5/5	5/5	5/5	5/5	5/5	5/5 Left	5/5	5/5	5/5	5/5	5/5	5/5 [de-identified] : MRI Lumbar Spine 01/09/2024 reviewed: Foraminal stenosis at L4-L5 on the left No central stenosis

## 2024-05-15 NOTE — ADDENDUM
[FreeTextEntry1] : I, Soraya Frazier, acted solely as a scribe for Dr. Gopi Marley MD on this date 05/15/2024   All medical record entries made by the Scribe were at my, Dr. Gopi Marley MD., direction and personally dictated by me on 05/15/2024 . I have reviewed the chart and agree that the record accurately reflects my personal performance of the history, physical exam, assessment and plan. I have also personally directed, reviewed, and agreed with the chart.

## 2024-07-25 ENCOUNTER — APPOINTMENT (OUTPATIENT)
Dept: GASTROENTEROLOGY | Facility: CLINIC | Age: 89
End: 2024-07-25
Payer: MEDICARE

## 2024-07-25 VITALS
HEART RATE: 52 BPM | BODY MASS INDEX: 22.5 KG/M2 | HEIGHT: 66 IN | WEIGHT: 140 LBS | DIASTOLIC BLOOD PRESSURE: 40 MMHG | SYSTOLIC BLOOD PRESSURE: 136 MMHG

## 2024-07-25 DIAGNOSIS — E11.43 TYPE 2 DIABETES MELLITUS WITH DIABETIC AUTONOMIC (POLY)NEUROPATHY: ICD-10-CM

## 2024-07-25 DIAGNOSIS — K31.84 TYPE 2 DIABETES MELLITUS WITH DIABETIC AUTONOMIC (POLY)NEUROPATHY: ICD-10-CM

## 2024-07-25 DIAGNOSIS — K59.00 CONSTIPATION, UNSPECIFIED: ICD-10-CM

## 2024-07-25 DIAGNOSIS — K21.9 GASTRO-ESOPHAGEAL REFLUX DISEASE W/OUT ESOPHAGITIS: ICD-10-CM

## 2024-07-25 PROCEDURE — 99214 OFFICE O/P EST MOD 30 MIN: CPT

## 2024-07-25 RX ORDER — FUROSEMIDE 20 MG/1
20 TABLET ORAL
Refills: 0 | Status: ACTIVE | COMMUNITY

## 2024-07-25 NOTE — REVIEW OF SYSTEMS
[Lower Ext Edema (lower leg swelling)] : lower extremity edema [Constipation] : constipation [Heartburn] : heartburn [Joint Stiffness] : joint stiffness [Difficulty Walking] : difficulty walking [Easy Bruising] : a tendency for easy bruising [Negative] : Endocrine

## 2024-07-26 NOTE — ASSESSMENT
[FreeTextEntry1] : 1.  GERD, diabetic gastroparesis, on domperidone. 2.  Erratic bowel habits, now with constipation. 3.  Iron deficiency anemia.  Multifactorial due to MDS, CKD, chronic inflammation, radiation proctitis.  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 4.  CAD status post stent. 5.  AS status post TAVR. 6.  Long-standing type 1 diabetes mellitus on insulin pump. 7.  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 8.  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 9.  Hypertension. 10.  Prostate cancer, status post radiation seed implants and external beam radiation. 11.  Allergic to IV dye.  Plan: - Obtain recent labs for review. - Can try to alternate Miralax and milk of magnesia. - Continue domperidone with dinner.  Samples of Gimoti given today. - Continue sucralfate enemas PRN rectal bleeding. - Continue PPI, can alternate with Pepcid. - Follow up in 1 year.

## 2024-07-26 NOTE — PHYSICAL EXAM
[Alert] : alert [No Acute Distress] : no acute distress [Sclera] : the sclera and conjunctiva were normal [Hearing Threshold Finger Rub Not [place]] : hearing was normal [No Respiratory Distress] : no respiratory distress [Auscultation Breath Sounds / Voice Sounds] : lungs were clear to auscultation bilaterally [Heart Rate And Rhythm] : heart rate was normal and rhythm regular [Normal S1, S2] : normal S1 and S2 [Bowel Sounds] : normal bowel sounds [Abdomen Tenderness] : non-tender [No Masses] : no abdominal mass palpated [Abdomen Soft] : soft [Normal Sphincter Tone] : normal sphincter tone [No Focal Deficits] : no focal deficits [Oriented To Time, Place, And Person] : oriented to person, place, and time [Occult Blood] : negative occult blood [FIT Test] : negative FIT test [de-identified] : gentle gait

## 2024-07-26 NOTE — PHYSICAL EXAM
[Alert] : alert [No Acute Distress] : no acute distress [Sclera] : the sclera and conjunctiva were normal [Hearing Threshold Finger Rub Not [place]] : hearing was normal [No Respiratory Distress] : no respiratory distress [Auscultation Breath Sounds / Voice Sounds] : lungs were clear to auscultation bilaterally [Heart Rate And Rhythm] : heart rate was normal and rhythm regular [Normal S1, S2] : normal S1 and S2 [Bowel Sounds] : normal bowel sounds [Abdomen Tenderness] : non-tender [No Masses] : no abdominal mass palpated [Abdomen Soft] : soft [Normal Sphincter Tone] : normal sphincter tone [No Focal Deficits] : no focal deficits [Oriented To Time, Place, And Person] : oriented to person, place, and time [Occult Blood] : negative occult blood [FIT Test] : negative FIT test [de-identified] : gentle gait

## 2025-01-03 NOTE — PRE-ANESTHESIA EVALUATION ADULT - NSPROPOSEDPROCEDFT_GEN_ALL_CORE
Echo reviewed in Dr. Feng's absence: Heart pumping strength is normal.  Aortic valve still has mild degree of narrowing.  Aortic size is stable.  Overall no change from the previous echocardiogram.  Results are reassuring.    Please call patient with test results. decompression

## 2025-01-07 ENCOUNTER — NON-APPOINTMENT (OUTPATIENT)
Age: 89
End: 2025-01-07

## 2025-01-30 NOTE — PRE-ANESTHESIA EVALUATION ADULT - BSA (M2)
Omar Barth  Surgery  [facility] [facility]  [place] [address]  Phone: [phone]  Fax: [phone]  Follow Up Time:    1.72

## 2025-05-08 ENCOUNTER — APPOINTMENT (OUTPATIENT)
Dept: SURGICAL ONCOLOGY | Facility: CLINIC | Age: 89
End: 2025-05-08
Payer: MEDICARE

## 2025-05-08 DIAGNOSIS — C43.4 MALIGNANT MELANOMA OF SCALP AND NECK: ICD-10-CM

## 2025-05-08 PROCEDURE — 99205 OFFICE O/P NEW HI 60 MIN: CPT

## 2025-05-19 ENCOUNTER — NON-APPOINTMENT (OUTPATIENT)
Age: 89
End: 2025-05-19

## 2025-05-21 ENCOUNTER — APPOINTMENT (OUTPATIENT)
Dept: GASTROENTEROLOGY | Facility: CLINIC | Age: 89
End: 2025-05-21

## 2025-05-21 VITALS — HEART RATE: 73 BPM | DIASTOLIC BLOOD PRESSURE: 68 MMHG | SYSTOLIC BLOOD PRESSURE: 159 MMHG

## 2025-05-21 VITALS — BODY MASS INDEX: 21.69 KG/M2 | HEIGHT: 66 IN | WEIGHT: 135 LBS

## 2025-05-21 DIAGNOSIS — E11.43 TYPE 2 DIABETES MELLITUS WITH DIABETIC AUTONOMIC (POLY)NEUROPATHY: ICD-10-CM

## 2025-05-21 DIAGNOSIS — K31.84 TYPE 2 DIABETES MELLITUS WITH DIABETIC AUTONOMIC (POLY)NEUROPATHY: ICD-10-CM

## 2025-05-21 DIAGNOSIS — R19.4 CHANGE IN BOWEL HABIT: ICD-10-CM

## 2025-05-21 DIAGNOSIS — D50.9 IRON DEFICIENCY ANEMIA, UNSPECIFIED: ICD-10-CM

## 2025-05-21 DIAGNOSIS — K21.9 GASTRO-ESOPHAGEAL REFLUX DISEASE W/OUT ESOPHAGITIS: ICD-10-CM

## 2025-05-21 DIAGNOSIS — K62.7 RADIATION PROCTITIS: ICD-10-CM

## 2025-05-21 DIAGNOSIS — K59.00 CONSTIPATION, UNSPECIFIED: ICD-10-CM

## 2025-05-21 PROCEDURE — 99214 OFFICE O/P EST MOD 30 MIN: CPT

## 2025-05-21 PROCEDURE — G2211 COMPLEX E/M VISIT ADD ON: CPT

## 2025-05-21 RX ORDER — BUMETANIDE 0.5 MG/1
0.5 TABLET ORAL
Refills: 0 | Status: ACTIVE | COMMUNITY

## 2025-05-21 RX ORDER — SPIRONOLACTONE 25 MG/1
25 TABLET ORAL
Refills: 0 | Status: ACTIVE | COMMUNITY

## 2025-05-23 ENCOUNTER — OUTPATIENT (OUTPATIENT)
Dept: OUTPATIENT SERVICES | Facility: HOSPITAL | Age: 89
LOS: 1 days | End: 2025-05-23

## 2025-05-23 VITALS
HEART RATE: 63 BPM | RESPIRATION RATE: 16 BRPM | TEMPERATURE: 98 F | OXYGEN SATURATION: 98 % | WEIGHT: 134.92 LBS | SYSTOLIC BLOOD PRESSURE: 158 MMHG | HEIGHT: 66 IN | DIASTOLIC BLOOD PRESSURE: 76 MMHG

## 2025-05-23 DIAGNOSIS — Z92.3 PERSONAL HISTORY OF IRRADIATION: Chronic | ICD-10-CM

## 2025-05-23 DIAGNOSIS — C43.4 MALIGNANT MELANOMA OF SCALP AND NECK: ICD-10-CM

## 2025-05-23 DIAGNOSIS — I10 ESSENTIAL (PRIMARY) HYPERTENSION: ICD-10-CM

## 2025-05-23 DIAGNOSIS — Z98.49 CATARACT EXTRACTION STATUS, UNSPECIFIED EYE: Chronic | ICD-10-CM

## 2025-05-23 DIAGNOSIS — Z95.0 PRESENCE OF CARDIAC PACEMAKER: Chronic | ICD-10-CM

## 2025-05-23 DIAGNOSIS — Z96.7 PRESENCE OF OTHER BONE AND TENDON IMPLANTS: Chronic | ICD-10-CM

## 2025-05-23 DIAGNOSIS — E10.9 TYPE 1 DIABETES MELLITUS WITHOUT COMPLICATIONS: ICD-10-CM

## 2025-05-23 DIAGNOSIS — I25.10 ATHEROSCLEROTIC HEART DISEASE OF NATIVE CORONARY ARTERY WITHOUT ANGINA PECTORIS: ICD-10-CM

## 2025-05-23 DIAGNOSIS — Z95.0 PRESENCE OF CARDIAC PACEMAKER: ICD-10-CM

## 2025-05-23 DIAGNOSIS — Z86.79 PERSONAL HISTORY OF OTHER DISEASES OF THE CIRCULATORY SYSTEM: ICD-10-CM

## 2025-05-23 DIAGNOSIS — Z98.61 CORONARY ANGIOPLASTY STATUS: Chronic | ICD-10-CM

## 2025-05-23 DIAGNOSIS — Z90.89 ACQUIRED ABSENCE OF OTHER ORGANS: Chronic | ICD-10-CM

## 2025-05-23 DIAGNOSIS — Z98.890 OTHER SPECIFIED POSTPROCEDURAL STATES: Chronic | ICD-10-CM

## 2025-05-23 DIAGNOSIS — Z95.2 PRESENCE OF PROSTHETIC HEART VALVE: Chronic | ICD-10-CM

## 2025-05-23 RX ORDER — SODIUM CHLORIDE 9 G/1000ML
1000 INJECTION, SOLUTION INTRAVENOUS
Refills: 0 | Status: ACTIVE | OUTPATIENT
Start: 2025-06-05 | End: 2026-05-04

## 2025-05-23 RX ORDER — GLUCAGON 3 MG/1
1 POWDER NASAL ONCE
Refills: 0 | Status: ACTIVE | OUTPATIENT
Start: 2025-06-05

## 2025-05-23 RX ORDER — DEXTROSE 50 % IN WATER 50 %
25 SYRINGE (ML) INTRAVENOUS ONCE
Refills: 0 | Status: ACTIVE | OUTPATIENT
Start: 2025-06-05

## 2025-05-23 RX ORDER — DEXTROSE 50 % IN WATER 50 %
15 SYRINGE (ML) INTRAVENOUS ONCE
Refills: 0 | Status: ACTIVE | OUTPATIENT
Start: 2025-06-05

## 2025-05-23 RX ORDER — DEXTROSE 50 % IN WATER 50 %
12.5 SYRINGE (ML) INTRAVENOUS ONCE
Refills: 0 | Status: ACTIVE | OUTPATIENT
Start: 2025-06-05

## 2025-05-23 NOTE — H&P PST ADULT - PROBLEM SELECTOR PLAN 6
Copy of card in chart and faxed to OR booking  Patient reports PPM inserted in [place], returned to [place] in May and last PPM interrogation at insertion time , requested report from hospital PPM Medtronic Serial #[id number] Model [id number]   Copy of card in chart and faxed to OR booking  Patient reports PPM inserted in [place], returned to [place] in May but his last PPM interrogation was in [place], requested report from hospital    EKG requested

## 2025-05-23 NOTE — H&P PST ADULT - PROBLEM SELECTOR PLAN 3
Patient instructed to take coreg on day of procedure with small sips of water, verbalized understanding.

## 2025-05-23 NOTE — H&P PST ADULT - HEMATOLOGY/LYMPHATICS COMMENTS
Hx Myelodysplastic syndromes- monitored Hgb treated with procrit when low, last intervention 2/2025 (5/2025 HGB 11.7, HCT 36.5) Hx Myelodysplastic syndromes- monitors Hgb; treated with procrit when low, last intervention 2/2025 (Last CBC on 5/2025 HGB 11.7, HCT 36.5)

## 2025-05-23 NOTE — H&P PST ADULT - PROBLEM SELECTOR PLAN 4
Patient instructed on the following medications adjustments: Hold lispro on day of procedure and reduce Tresiba o 6 units night before   POCT glucose testing ordered upon admission and Hypoglycemia protocol for history of insulin use Patient instructed on the following medications adjustments: Hold lispro on day of procedure and reduce Tresiba o 6 units night before   POCT glucose testing ordered upon admission and Hypoglycemia protocol for history of insulin use    Instructed to remove Dexcom on DOS

## 2025-05-23 NOTE — H&P PST ADULT - NS MD HP INPLANTS MED DEV
coronary artery stent x 1, bilateral ocular lenses, TAVR/Lens implant/Pacemaker/Vascular stents/Clips coronary artery stent x 1, bilateral ocular lenses, TAVR, Medtronic  PPM Medtronic Serial #[id number] Model [id number]/Lens implant/Pacemaker/Vascular stents/Clips

## 2025-05-23 NOTE — H&P PST ADULT - PROBLEM SELECTOR PLAN 2
The Management of Anticoagulation in the Anastacia-Procedural Period (MAPPP) tool is to be used to assist in determining the safest and most appropriate management in the anastacia- procedural period based on the most current guidelines and recommends patient continue aspirin

## 2025-05-23 NOTE — H&P PST ADULT - PROBLEM SELECTOR PLAN 1
Patient tentatively scheduled for surgery on: 6/5/25  Provided with verbal and written presurgical instructions  Patient instructed to hold NSAIDs, multivitamins and herbal supplements one week prior to surgery     CBC, HgA1c, BMP- in chart from 5/7/25  CBC HCT 36.5 HGB 11.7    BMP Crt 1.29 BUN 42  K 5.0   HGA1C 6.5%

## 2025-05-23 NOTE — H&P PST ADULT - FUNCTIONAL STATUS
Self reported functional capacity DASI score:  7.93  Denies chest pain, dyspnea, palpitations, syncope, orthopnea or claudication/4-10 METS

## 2025-05-23 NOTE — H&P PST ADULT - NSICDXPASTMEDICALHX_GEN_ALL_CORE_FT
PAST MEDICAL HISTORY:  Anemia of chronic disease     CAD (coronary artery disease)     Cardiac pacemaker     Chronic kidney disease (CKD)     GERD (gastroesophageal reflux disease)     H/O aortic valve stenosis     H/O erectile dysfunction     H/O heart failure     History of autoimmune disease     History of heartburn     History of radiation therapy     History of rheumatoid arthritis     [place] (hard of hearing)     Hypertension     Malignant melanoma of scalp     MDS (myelodysplastic syndrome), low grade     Myelodysplastic syndrome     PMR (polymyalgia rheumatica)     Prostate cancer     Sciatic nerve pain     Serum potassium elevated     Skin cancer, basal cell     Spinal stenosis     T1DM (type 1 diabetes mellitus)

## 2025-05-23 NOTE — H&P PST ADULT - NEGATIVE NEUROLOGICAL SYMPTOMS
no weakness/no paresthesias/no generalized seizures/no tremors/no vertigo/no difficulty walking/no hemiparesis/no confusion/no facial palsy

## 2025-05-23 NOTE — H&P PST ADULT - NSICDXPASTSURGICALHX_GEN_ALL_CORE_FT
PAST SURGICAL HISTORY:  Cardiac pacemaker     Fixation hardware in spine     H/O cataract extraction     History of brachytherapy     History of colonoscopy     History of percutaneous coronary intervention     History of tonsillectomy     History of transcatheter aortic valve replacement (TAVR)     S/P laminectomy

## 2025-05-23 NOTE — H&P PST ADULT - OTHER CARE PROVIDERS
Cardiologist: Dr. Clari Finley # [phone]    Endo Dr Mateusz Bustos   Nephrologist  Dr Marshall Fonseca Cardiologist: Dr. Montague, Edward  [phone]    Endo Dr Mateusz Bustos   Nephrologist  Dr Marshall Fonseca

## 2025-05-23 NOTE — H&P PST ADULT - PROBLEM SELECTOR PLAN 5
Patient instructed to take Bumex , spironolactone, on day of procedure with small sips of water, verbalized understanding.    Echo requested

## 2025-05-23 NOTE — H&P PST ADULT - ENDOCRINE COMMENTS
Type 1 diabetes- uses Dexcom glucose sensor , last hga1c 6.5 on 5/7/25 Type 1 diabetes- uses Dexcom glucose sensor , last hga1c  6.5% on 5/7/25

## 2025-05-23 NOTE — H&P PST ADULT - LAST CARDIAC ANGIOGRAM/IMAGING
2023- reports suspicious finding of pet scan, normal finding 2023- reports suspicious finding of pet scan as indication , normal finding

## 2025-05-23 NOTE — H&P PST ADULT - CARDIOVASCULAR COMMENTS
Diagnosed with HF s/p PPM 2/4/25 while in [place] after noting BRITO, weakness and cough; CAD s/p stent in 2021, TAVR Diagnosed with HF s/p PPM 2/4/25 while in [place] after noting BRITO, weakness and cough; CAD s/p stent in 2021, aortic valve stenosis s/p TAVR

## 2025-05-23 NOTE — H&P PST ADULT - HISTORY OF PRESENT ILLNESS
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, presents with PMH of low grade MDS dx 2022, anemia of chronic disease, PMR, kidney disease, T1DM dx age 37 (Dexcon glucose sensor in place) prostate cancer dx 2006 (had seed implantation and radiation therapy), Aortic valve stenosis s/p TAVR 10/2021, CAD s/p PCI with stenting 09/2021, sciatica nerve pain, [place] (bilateral hearing aids), reports he was experiencing fatigue, weight gain, persistent cough and was diagnosed with HF s/p PPM 2/2025, presents with scalp lesion s/p shave biopsy of his left central parietal scalp on 4/30/2025: Malignant melanoma patient is scheduled for excision of melanoma of scalp  with skin graft and left cervical sentinel node biopsy, Dr Davis for complex closure scalp and neck wound, possible local tissue rearrangement possible skin graft possible integra  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, presents with PMH of low grade MDS dx 2022, anemia of chronic disease, PMR, kidney disease, T1DM dx age 37 (Dexcon glucose sensor in place) prostate cancer dx 2006 (had seed implantation and radiation therapy), Aortic valve stenosis s/p TAVR 10/2021, CAD s/p PCI with stenting 09/2021, sciatica nerve pain, [place] (bilateral hearing aids), reports he was experiencing fatigue, weight gain, persistent cough and was diagnosed with HF s/p PPM 2/2025, presents with scalp lesion s/p shave biopsy of his left central parietal scalp on 4/30/2025 noting Malignant melanoma; patient is scheduled for excision of melanoma of scalp  with skin graft and left cervical sentinel node biopsy, Dr Davis for complex closure scalp and neck wound, possible local tissue rearrangement possible skin graft possible integra

## 2025-05-23 NOTE — H&P PST ADULT - GENITOURINARY COMMENTS
Hx prostate cancer s/p radiation seeds  Hx CKD - reports developing autoimmune reaction to PNA vaccine and developed kidney disease

## 2025-05-27 PROBLEM — Z86.79 PERSONAL HISTORY OF OTHER DISEASES OF THE CIRCULATORY SYSTEM: Chronic | Status: ACTIVE | Noted: 2025-05-23

## 2025-05-27 PROBLEM — M48.00 SPINAL STENOSIS, SITE UNSPECIFIED: Chronic | Status: ACTIVE | Noted: 2025-05-23

## 2025-05-27 PROBLEM — M35.3 POLYMYALGIA RHEUMATICA: Chronic | Status: ACTIVE | Noted: 2025-05-23

## 2025-05-27 PROBLEM — C43.4 MALIGNANT MELANOMA OF SCALP AND NECK: Chronic | Status: ACTIVE | Noted: 2025-05-23

## 2025-05-27 PROBLEM — Z95.0 PRESENCE OF CARDIAC PACEMAKER: Chronic | Status: ACTIVE | Noted: 2025-05-23

## 2025-05-27 PROBLEM — K21.9 GASTRO-ESOPHAGEAL REFLUX DISEASE WITHOUT ESOPHAGITIS: Chronic | Status: ACTIVE | Noted: 2025-05-23

## 2025-05-27 PROBLEM — D46.9 MYELODYSPLASTIC SYNDROME, UNSPECIFIED: Chronic | Status: ACTIVE | Noted: 2025-05-23

## 2025-05-27 PROBLEM — E87.5 HYPERKALEMIA: Chronic | Status: ACTIVE | Noted: 2025-05-23

## 2025-05-27 PROBLEM — C44.91 BASAL CELL CARCINOMA OF SKIN, UNSPECIFIED: Chronic | Status: ACTIVE | Noted: 2025-05-23

## 2025-06-02 ENCOUNTER — OUTPATIENT (OUTPATIENT)
Dept: OUTPATIENT SERVICES | Facility: HOSPITAL | Age: 89
LOS: 1 days | End: 2025-06-02
Payer: MEDICARE

## 2025-06-02 ENCOUNTER — RESULT REVIEW (OUTPATIENT)
Age: 89
End: 2025-06-02

## 2025-06-02 DIAGNOSIS — Z96.7 PRESENCE OF OTHER BONE AND TENDON IMPLANTS: Chronic | ICD-10-CM

## 2025-06-02 DIAGNOSIS — Z98.61 CORONARY ANGIOPLASTY STATUS: Chronic | ICD-10-CM

## 2025-06-02 DIAGNOSIS — Z92.3 PERSONAL HISTORY OF IRRADIATION: Chronic | ICD-10-CM

## 2025-06-02 DIAGNOSIS — Z98.890 OTHER SPECIFIED POSTPROCEDURAL STATES: Chronic | ICD-10-CM

## 2025-06-02 DIAGNOSIS — Z90.89 ACQUIRED ABSENCE OF OTHER ORGANS: Chronic | ICD-10-CM

## 2025-06-02 DIAGNOSIS — Z98.49 CATARACT EXTRACTION STATUS, UNSPECIFIED EYE: Chronic | ICD-10-CM

## 2025-06-02 DIAGNOSIS — Z95.0 PRESENCE OF CARDIAC PACEMAKER: Chronic | ICD-10-CM

## 2025-06-02 DIAGNOSIS — C80.1 MALIGNANT (PRIMARY) NEOPLASM, UNSPECIFIED: ICD-10-CM

## 2025-06-02 PROCEDURE — 88321 CONSLTJ&REPRT SLD PREP ELSWR: CPT

## 2025-06-03 LAB — SURGICAL PATHOLOGY STUDY: SIGNIFICANT CHANGE UP

## 2025-06-04 NOTE — ASU PATIENT PROFILE, ADULT - NSICDXPASTMEDICALHX_GEN_ALL_CORE_FT
PAST MEDICAL HISTORY:  Anemia of chronic disease     CAD (coronary artery disease)     Cardiac pacemaker     Chronic kidney disease (CKD)     GERD (gastroesophageal reflux disease)     H/O aortic valve stenosis     H/O erectile dysfunction     H/O heart failure     History of autoimmune disease     History of heartburn     History of radiation therapy     History of rheumatoid arthritis     [place] (hard of hearing)     Hypertension     Malignant melanoma of scalp     MDS (myelodysplastic syndrome), low grade     Myelodysplastic syndrome     PMR (polymyalgia rheumatica)     Prostate cancer     Sciatic nerve pain     Serum potassium elevated     Skin cancer, basal cell     Spinal stenosis     T1DM (type 1 diabetes mellitus)

## 2025-06-05 ENCOUNTER — RESULT REVIEW (OUTPATIENT)
Age: 89
End: 2025-06-05

## 2025-06-05 ENCOUNTER — APPOINTMENT (OUTPATIENT)
Dept: NUCLEAR MEDICINE | Facility: HOSPITAL | Age: 89
End: 2025-06-05

## 2025-06-05 ENCOUNTER — APPOINTMENT (OUTPATIENT)
Dept: PLASTIC SURGERY | Facility: HOSPITAL | Age: 89
End: 2025-06-05

## 2025-06-05 ENCOUNTER — TRANSCRIPTION ENCOUNTER (OUTPATIENT)
Age: 89
End: 2025-06-05

## 2025-06-05 ENCOUNTER — OUTPATIENT (OUTPATIENT)
Dept: INPATIENT UNIT | Facility: HOSPITAL | Age: 89
LOS: 1 days | End: 2025-06-05
Payer: MEDICARE

## 2025-06-05 ENCOUNTER — APPOINTMENT (OUTPATIENT)
Dept: SURGICAL ONCOLOGY | Facility: HOSPITAL | Age: 89
End: 2025-06-05

## 2025-06-05 VITALS
HEART RATE: 62 BPM | WEIGHT: 134.92 LBS | OXYGEN SATURATION: 98 % | HEIGHT: 66 IN | DIASTOLIC BLOOD PRESSURE: 55 MMHG | RESPIRATION RATE: 16 BRPM | SYSTOLIC BLOOD PRESSURE: 135 MMHG | TEMPERATURE: 98 F

## 2025-06-05 VITALS
SYSTOLIC BLOOD PRESSURE: 135 MMHG | DIASTOLIC BLOOD PRESSURE: 49 MMHG | OXYGEN SATURATION: 97 % | HEART RATE: 65 BPM | RESPIRATION RATE: 12 BRPM | TEMPERATURE: 98 F

## 2025-06-05 DIAGNOSIS — Z98.890 OTHER SPECIFIED POSTPROCEDURAL STATES: Chronic | ICD-10-CM

## 2025-06-05 DIAGNOSIS — Z98.61 CORONARY ANGIOPLASTY STATUS: Chronic | ICD-10-CM

## 2025-06-05 DIAGNOSIS — C43.4 MALIGNANT MELANOMA OF SCALP AND NECK: ICD-10-CM

## 2025-06-05 DIAGNOSIS — Z90.89 ACQUIRED ABSENCE OF OTHER ORGANS: Chronic | ICD-10-CM

## 2025-06-05 DIAGNOSIS — Z96.7 PRESENCE OF OTHER BONE AND TENDON IMPLANTS: Chronic | ICD-10-CM

## 2025-06-05 DIAGNOSIS — Z95.0 PRESENCE OF CARDIAC PACEMAKER: Chronic | ICD-10-CM

## 2025-06-05 DIAGNOSIS — Z95.2 PRESENCE OF PROSTHETIC HEART VALVE: Chronic | ICD-10-CM

## 2025-06-05 DIAGNOSIS — Z98.49 CATARACT EXTRACTION STATUS, UNSPECIFIED EYE: Chronic | ICD-10-CM

## 2025-06-05 DIAGNOSIS — Z92.3 PERSONAL HISTORY OF IRRADIATION: Chronic | ICD-10-CM

## 2025-06-05 LAB — GLUCOSE BLDC GLUCOMTR-MCNC: 166 MG/DL — HIGH (ref 70–99)

## 2025-06-05 PROCEDURE — 38510 BIOPSY/REMOVAL LYMPH NODES: CPT | Mod: LT

## 2025-06-05 PROCEDURE — 88342 IMHCHEM/IMCYTCHM 1ST ANTB: CPT | Mod: 26

## 2025-06-05 PROCEDURE — 88307 TISSUE EXAM BY PATHOLOGIST: CPT | Mod: 26

## 2025-06-05 PROCEDURE — 15221 FTH/GFT FR S/A/L EACH ADDL: CPT

## 2025-06-05 PROCEDURE — 21016 RESECT FACE/SCALP TUM 2 CM/>: CPT

## 2025-06-05 PROCEDURE — 88305 TISSUE EXAM BY PATHOLOGIST: CPT | Mod: 26

## 2025-06-05 PROCEDURE — 15004 WOUND PREP F/N/HF/G: CPT

## 2025-06-05 PROCEDURE — 38900 IO MAP OF SENT LYMPH NODE: CPT | Mod: LT

## 2025-06-05 PROCEDURE — 15220 FTH/GFT FR S/A/L 20 SQ CM/<: CPT

## 2025-06-05 PROCEDURE — 88341 IMHCHEM/IMCYTCHM EA ADD ANTB: CPT | Mod: 26

## 2025-06-05 PROCEDURE — 78195 LYMPH SYSTEM IMAGING: CPT | Mod: 26

## 2025-06-05 PROCEDURE — 38792 RA TRACER ID OF SENTINL NODE: CPT | Mod: 59,LT

## 2025-06-05 RX ORDER — HYDROMORPHONE/SOD CHLOR,ISO/PF 2 MG/10 ML
1 SYRINGE (ML) INJECTION
Refills: 0 | Status: DISCONTINUED | OUTPATIENT
Start: 2025-06-05 | End: 2025-06-05

## 2025-06-05 RX ORDER — BUMETANIDE 1 MG/1
1 TABLET ORAL
Refills: 0 | DISCHARGE

## 2025-06-05 RX ORDER — HYDROMORPHONE/SOD CHLOR,ISO/PF 2 MG/10 ML
0.5 SYRINGE (ML) INJECTION
Refills: 0 | Status: DISCONTINUED | OUTPATIENT
Start: 2025-06-05 | End: 2025-06-05

## 2025-06-05 RX ORDER — SODIUM ZIRCONIUM CYCLOSILICATE 5 G/5G
1 POWDER, FOR SUSPENSION ORAL
Refills: 0 | DISCHARGE

## 2025-06-05 RX ORDER — INSULIN LISPRO 100 U/ML
0 INJECTION, SOLUTION INTRAVENOUS; SUBCUTANEOUS
Refills: 0 | DISCHARGE

## 2025-06-05 RX ORDER — SPIRONOLACTONE 25 MG
1 TABLET ORAL
Refills: 0 | DISCHARGE

## 2025-06-05 RX ORDER — METHOTREXATE 25 MG/ML
7 INJECTION, SOLUTION INTRA-ARTERIAL; INTRAMUSCULAR; INTRATHECAL; INTRAVENOUS
Refills: 0 | DISCHARGE

## 2025-06-05 RX ORDER — OMEPRAZOLE 20 MG/1
1 CAPSULE, DELAYED RELEASE ORAL
Refills: 0 | DISCHARGE

## 2025-06-05 RX ORDER — SODIUM CHLORIDE 9 G/1000ML
1000 INJECTION, SOLUTION INTRAVENOUS
Refills: 0 | Status: DISCONTINUED | OUTPATIENT
Start: 2025-06-05 | End: 2025-06-05

## 2025-06-05 RX ORDER — INSULIN DEGLUDEC 100 U/ML
8 INJECTION, SOLUTION SUBCUTANEOUS
Refills: 0 | DISCHARGE

## 2025-06-05 RX ORDER — CALCITRIOL 0.5 UG/1
1 CAPSULE, GELATIN COATED ORAL
Refills: 0 | DISCHARGE

## 2025-06-05 RX ORDER — DENOSUMAB 60 MG/ML
60 INJECTION SUBCUTANEOUS
Refills: 0 | DISCHARGE

## 2025-06-05 RX ORDER — ONDANSETRON HCL/PF 4 MG/2 ML
4 VIAL (ML) INJECTION EVERY 4 HOURS
Refills: 0 | Status: ACTIVE | OUTPATIENT
Start: 2025-06-05 | End: 2026-05-04

## 2025-06-05 RX ORDER — CARVEDILOL 3.12 MG/1
1 TABLET, FILM COATED ORAL
Refills: 0 | DISCHARGE

## 2025-06-05 RX ORDER — CEFADROXIL 500 MG/1
1 CAPSULE ORAL
Qty: 14 | Refills: 0
Start: 2025-06-05 | End: 2025-06-11

## 2025-06-05 NOTE — BRIEF OPERATIVE NOTE - OPERATION/FINDINGS
Isosulfan blue and nuclear tracer were injected pre-operatively. Left cervical lymph node dissection was performed with gamma positive and blue lymph nodes excised. Previous scalp biopsy of melanoma identified 1.2cm of margins from lesion were demarcated. Dissection of scalp melanoma site and 1.2cm was performed with stitches marking margins. Closure and graft per PRS. 
Scalp melanoma excision and left cervical SLNB with surgical oncology. FTSG from left abdomen to scalp defect. Bolster dressing to scalp.

## 2025-06-05 NOTE — ASU PREOP CHECKLIST - HEIGHT IN INCHES
6 Cartilage Graft Text: The defect edges were debeveled with a #15 scalpel blade.  Given the location of the defect, shape of the defect, the fact the defect involved a full thickness cartilage defect a cartilage graft was deemed most appropriate.  An appropriate donor site was identified, cleansed, and anesthetized. The cartilage graft was then harvested and transferred to the recipient site, oriented appropriately and then sutured into place.  The secondary defect was then repaired using a primary closure.

## 2025-06-05 NOTE — BRIEF OPERATIVE NOTE - NSICDXBRIEFPROCEDURE_GEN_ALL_CORE_FT
PROCEDURES:  Application, graft, skin, full thickness, to scalp 05-Jun-2025 10:23:27  Fadumo Senior  
PROCEDURES:  Excision, melanoma, scalp, with sentinel lymph node biopsy 05-Jun-2025 10:23:11  Fadumo Senior  Application, graft, skin, full thickness, to scalp 05-Jun-2025 10:23:27  Fadumo Senior

## 2025-06-05 NOTE — ASU PREOP CHECKLIST - 4.
PPM tavr, 1x cardiac stent PPM tavr, 1x cardiac stent, radiation seeds, spine hardware PPM - as per EP - okay to put magnet,, TAVR, 1x cardiac stent, radiation seeds, spine hardware

## 2025-06-05 NOTE — ASU PREOP CHECKLIST - 3.
upper and lower partial dentures, bilateral hearing aids upper and lower partial dentures, bilateral hearing aids, glasses upper and lower partial dentures, bilateral hearing aids, glasses in asu locker 13

## 2025-06-05 NOTE — ASU PREOP CHECKLIST - COMMENTS
meds with a sip of water cavedilol, spironolactone taken this am with a sip of water carvedilol and spironolactone taken this am with a sip of water

## 2025-06-05 NOTE — BRIEF OPERATIVE NOTE - NSICDXBRIEFPOSTOP_GEN_ALL_CORE_FT
POST-OP DIAGNOSIS:  Melanoma 05-Jun-2025 10:23:46  Fadumo Senior  
POST-OP DIAGNOSIS:  Melanoma 05-Jun-2025 10:23:46  Fadumo Senior

## 2025-06-05 NOTE — BRIEF OPERATIVE NOTE - NSICDXBRIEFPREOP_GEN_ALL_CORE_FT
PRE-OP DIAGNOSIS:  Melanoma 05-Jun-2025 10:23:37  Fadumo Senior  
PRE-OP DIAGNOSIS:  Melanoma 05-Jun-2025 10:23:37  Fadumo Senior

## 2025-06-05 NOTE — ASU DISCHARGE PLAN (ADULT/PEDIATRIC) - FINANCIAL ASSISTANCE
[facility] provides services at a reduced cost to those who are determined to be eligible through [facility]’s financial assistance program. Information regarding [facility]’s financial assistance program can be found by going to [email or web address] or by calling [phone].

## 2025-06-05 NOTE — ASU PREOP CHECKLIST - PATIENT'S PERSONAL PROPERTY REMOVED
Hyponatremia Hyponatremia Hyponatremia Hyponatremia hearing aids dentures/glasses/hearing aids asu 13/dentures/glasses/hearing aids

## 2025-06-05 NOTE — ASU DISCHARGE PLAN (ADULT/PEDIATRIC) - ASU DC SPECIAL INSTRUCTIONSFT
PAIN: You may take Acetaminophen (Tylenol) and Ibuprofen (Advil, Motrin) over the counter as needed for pain.   MEDICATIONS: Please take the prescribed antibiotic sent to [facility] as directed on the medication bottle. Please continue taking your usual home medications.  WOUND CARE:  Please keep the bolster dressing on the scalp dry until your follow up appointment with Dr. Davis. You should allow warm soapy water to run down the wound on the abdomen in the shower. You should not need to scrub the area. You do not have any stitches that need to be removed. If you have glue or steri-strips on your wound, it will fall off on its own.  BATHING: Please do not soak or submerge the wound in water (bath, swimming) for 10 days after your surgery.  ACTIVITY: No heavy lifting, straining, or vigorous activity until your follow-up appointment in 2 weeks.   NOTIFY US IF: You have any bleeding that does not stop, any pus draining from your wound(s), any fever (over 100.5 F) or chills, persistent nausea/vomiting, persistent diarrhea, or if your pain is not controlled on your discharge pain medications.  FOLLOW-UP: Please call the office and make an appointment to follow up with Dr. Davis in 2 weeks.

## 2025-06-05 NOTE — ASU DISCHARGE PLAN (ADULT/PEDIATRIC) - NS MD DC FALL RISK RISK
For information on Fall & Injury Prevention, visit: [email or web address] OR  [email or web address] OR  https://www.cdc.gov/steadi/patient.html

## 2025-06-05 NOTE — ASU PREOP CHECKLIST - INTERNAL PROSTHESES
ppm, cardiac stent, tavr, radiation seed,/yes(specify) ppm, cardiac stent, tavr, radiation seed, spine hardware/yes(specify)

## 2025-06-05 NOTE — ASU DISCHARGE PLAN (ADULT/PEDIATRIC) - NURSING INSTRUCTIONS
You received IV Tylenol for pain management at 10:30am. Please DO NOT take any Tylenol (Acetaminophen) containing products, such as Vicodin, Percocet, Excedrin, and cold medications for the next 6 hours (until 4:30pm). DO NOT TAKE MORE THAN 3000 MG OF TYLENOL in a 24 hour period.

## 2025-06-05 NOTE — BRIEF OPERATIVE NOTE - SPECIMENS
Per surgical oncology
[place] Lymph node biopsy from cervical lymph nodes x2, Scalp melanoma excision 4x5cm

## 2025-06-05 NOTE — ASU DISCHARGE PLAN (ADULT/PEDIATRIC) - CARE PROVIDER_API CALL
Mateusz Davis  Plastic Surgery  [address] [place] [address], [address]  [place] [address]  Phone: [phone]  Fax: [phone]  Follow Up Time:

## 2025-06-16 LAB — SURGICAL PATHOLOGY STUDY: SIGNIFICANT CHANGE UP

## 2025-06-20 ENCOUNTER — APPOINTMENT (OUTPATIENT)
Dept: PLASTIC SURGERY | Facility: CLINIC | Age: 89
End: 2025-06-20
Payer: MEDICARE

## 2025-06-20 VITALS
HEART RATE: 82 BPM | BODY MASS INDEX: 23.32 KG/M2 | OXYGEN SATURATION: 97 % | HEIGHT: 65 IN | SYSTOLIC BLOOD PRESSURE: 157 MMHG | DIASTOLIC BLOOD PRESSURE: 77 MMHG | WEIGHT: 140 LBS | TEMPERATURE: 98.7 F

## 2025-06-20 PROBLEM — Z94.5 S/P SPLIT THICKNESS SKIN GRAFT: Status: ACTIVE | Noted: 2025-06-20

## 2025-06-20 PROCEDURE — 99024 POSTOP FOLLOW-UP VISIT: CPT

## 2025-06-20 RX ORDER — GAUZE BANDAGE 4"X3.5"
SPONGE TOPICAL
Qty: 1 | Refills: 1 | Status: ACTIVE | COMMUNITY
Start: 2025-06-20 | End: 1900-01-01

## 2025-06-26 ENCOUNTER — APPOINTMENT (OUTPATIENT)
Dept: SURGICAL ONCOLOGY | Facility: CLINIC | Age: 89
End: 2025-06-26
Payer: MEDICARE

## 2025-06-26 ENCOUNTER — NON-APPOINTMENT (OUTPATIENT)
Age: 89
End: 2025-06-26

## 2025-06-26 VITALS
BODY MASS INDEX: 23.32 KG/M2 | HEIGHT: 65 IN | HEART RATE: 84 BPM | WEIGHT: 140 LBS | OXYGEN SATURATION: 98 % | DIASTOLIC BLOOD PRESSURE: 78 MMHG | SYSTOLIC BLOOD PRESSURE: 153 MMHG

## 2025-06-26 PROCEDURE — 99024 POSTOP FOLLOW-UP VISIT: CPT

## 2025-07-02 ENCOUNTER — APPOINTMENT (OUTPATIENT)
Dept: PLASTIC SURGERY | Facility: CLINIC | Age: 89
End: 2025-07-02
Payer: MEDICARE

## 2025-07-02 VITALS
DIASTOLIC BLOOD PRESSURE: 71 MMHG | OXYGEN SATURATION: 97 % | BODY MASS INDEX: 23.32 KG/M2 | TEMPERATURE: 98.5 F | HEART RATE: 84 BPM | HEIGHT: 65 IN | WEIGHT: 140 LBS | SYSTOLIC BLOOD PRESSURE: 145 MMHG

## 2025-07-02 PROCEDURE — 99024 POSTOP FOLLOW-UP VISIT: CPT

## 2025-07-03 RX ORDER — LINACLOTIDE 145 UG/1
145 CAPSULE, GELATIN COATED ORAL
Qty: 90 | Refills: 3 | Status: ACTIVE | COMMUNITY
Start: 2025-07-03 | End: 1900-01-01

## 2025-07-30 ENCOUNTER — APPOINTMENT (OUTPATIENT)
Dept: PLASTIC SURGERY | Facility: CLINIC | Age: 89
End: 2025-07-30
Payer: MEDICARE

## 2025-07-30 VITALS
SYSTOLIC BLOOD PRESSURE: 135 MMHG | HEART RATE: 68 BPM | BODY MASS INDEX: 23.32 KG/M2 | OXYGEN SATURATION: 95 % | WEIGHT: 140 LBS | HEIGHT: 65 IN | TEMPERATURE: 98.7 F | RESPIRATION RATE: 17 BRPM | DIASTOLIC BLOOD PRESSURE: 76 MMHG

## 2025-07-30 PROCEDURE — 99024 POSTOP FOLLOW-UP VISIT: CPT

## 2025-07-31 ENCOUNTER — APPOINTMENT (OUTPATIENT)
Dept: PODIATRY | Facility: CLINIC | Age: 89
End: 2025-07-31
Payer: MEDICARE

## 2025-07-31 DIAGNOSIS — M65.979 UNSPECIFIED SYNOVITIS AND TENOSYNOVITIS, UNSPECIFIED ANK/FT: ICD-10-CM

## 2025-07-31 DIAGNOSIS — M25.572 PAIN IN LEFT ANKLE AND JOINTS OF LEFT FOOT: ICD-10-CM

## 2025-07-31 PROCEDURE — 73610 X-RAY EXAM OF ANKLE: CPT | Mod: LT

## 2025-07-31 PROCEDURE — 20600 DRAIN/INJ JOINT/BURSA W/O US: CPT | Mod: LT

## 2025-07-31 PROCEDURE — 99213 OFFICE O/P EST LOW 20 MIN: CPT | Mod: 25

## 2025-08-19 ENCOUNTER — APPOINTMENT (OUTPATIENT)
Dept: PODIATRY | Facility: CLINIC | Age: 89
End: 2025-08-19
Payer: MEDICARE

## 2025-08-19 DIAGNOSIS — M25.572 PAIN IN LEFT ANKLE AND JOINTS OF LEFT FOOT: ICD-10-CM

## 2025-08-19 DIAGNOSIS — M65.979 UNSPECIFIED SYNOVITIS AND TENOSYNOVITIS, UNSPECIFIED ANK/FT: ICD-10-CM

## 2025-08-19 PROCEDURE — 20600 DRAIN/INJ JOINT/BURSA W/O US: CPT | Mod: LT

## (undated) DEVICE — DRSG TEGADERM 6"X8"

## (undated) DEVICE — POSITIONER FOAM EGG CRATE ULNAR 2PCS (PINK)

## (undated) DEVICE — SUT VICRYL 0 18" OS-6 (POP-OFF)

## (undated) DEVICE — SUCTION YANKAUER NO CONTROL VENT

## (undated) DEVICE — DRAPE CHEST BREAST 106" X 122"

## (undated) DEVICE — GLV 8 PROTEXIS (WHITE)

## (undated) DEVICE — SYR LUER LOK 20CC

## (undated) DEVICE — NDL SPINAL 18G X 3.5" (PINK)

## (undated) DEVICE — SPECIMEN CONTAINER 100ML

## (undated) DEVICE — SYR LUER LOK 10CC

## (undated) DEVICE — DRSG TELFA 3 X 8

## (undated) DEVICE — DRAPE 3/4 SHEET W REINFORCEMENT 56X77"

## (undated) DEVICE — PREP CHLORAPREP HI-LITE ORANGE 26ML

## (undated) DEVICE — Device

## (undated) DEVICE — GLV 8.5 PROTEXIS (WHITE)

## (undated) DEVICE — SUT VICRYL 2-0 18" CP-2 UNDYED (POP-OFF)

## (undated) DEVICE — SUT MONOCRYL 3-0 18" PS-2 UNDYED

## (undated) DEVICE — STAPLER SKIN VISI-STAT 35 WIDE

## (undated) DEVICE — SUT MONOCRYL 4-0 27" PS-2 UNDYED

## (undated) DEVICE — GLV 8.5 PROTEXIS (BLUE)

## (undated) DEVICE — DRSG DERMABOND 0.7ML

## (undated) DEVICE — DRAPE INSTRUMENT POUCH 6.75" X 11"

## (undated) DEVICE — DRSG STERISTRIPS 0.5 X 4"

## (undated) DEVICE — DRAPE MICROSCOPE OPMI VISIONGUARD 48X118"

## (undated) DEVICE — ELCTR BOVIE TIP BLADE INSULATED 2.8" EDGE WITH SAFETY

## (undated) DEVICE — MIDAS REX MR8 MATCH HEAD FLUTED LG BORE 3MM X 14CM

## (undated) DEVICE — WARMING BLANKET LOWER ADULT

## (undated) DEVICE — FRAZIER SUCTION TIP 8FR

## (undated) DEVICE — ELCTR BAYONET PENCIL

## (undated) DEVICE — FRAZIER SUCTION TIP 12FR

## (undated) DEVICE — SPONGE PEANUT AUTO COUNT

## (undated) DEVICE — SOL IRR POUR NS 0.9% 500ML

## (undated) DEVICE — DRAPE WARMING SOLUTION 44 X 44"

## (undated) DEVICE — LIGASURE SMALL JAW

## (undated) DEVICE — ELCTR GROUNDING PAD ADULT COVIDIEN

## (undated) DEVICE — BASIN SET DOUBLE

## (undated) DEVICE — NDL HYPO REGULAR BEVEL 25G X 1.5" (BLUE)

## (undated) DEVICE — ELCTR BOVIE PENCIL SMOKE EVACUATION

## (undated) DEVICE — WOUND IRR SURGIPHOR

## (undated) DEVICE — DRAPE EQUIPMENT COVER 27"

## (undated) DEVICE — MIDAS REX MR7 LUBRICANT DIFFUSER CARTRIDGE

## (undated) DEVICE — PACK LUMBAR LAMI

## (undated) DEVICE — POSITIONER CUSHION INSERT PRONE VIEW LG

## (undated) DEVICE — LABELS BLANK W PEN

## (undated) DEVICE — SOL IRR POUR H2O 250ML

## (undated) DEVICE — VENODYNE/SCD SLEEVE CALF MEDIUM

## (undated) DEVICE — DRAPE C ARM C-ARMOUR

## (undated) DEVICE — PACK MAJOR ABDOMINAL WITH LAP

## (undated) DEVICE — DRAPE 1/2 SHEET 40X57"

## (undated) DEVICE — VENODYNE/SCD SLEEVE CALF LARGE